# Patient Record
Sex: FEMALE | Race: OTHER | HISPANIC OR LATINO
[De-identification: names, ages, dates, MRNs, and addresses within clinical notes are randomized per-mention and may not be internally consistent; named-entity substitution may affect disease eponyms.]

---

## 2017-01-17 ENCOUNTER — APPOINTMENT (OUTPATIENT)
Dept: DERMATOLOGY | Facility: CLINIC | Age: 37
End: 2017-01-17

## 2017-01-17 VITALS — HEIGHT: 62 IN | WEIGHT: 133 LBS | BODY MASS INDEX: 24.48 KG/M2

## 2017-01-17 VITALS — SYSTOLIC BLOOD PRESSURE: 105 MMHG | DIASTOLIC BLOOD PRESSURE: 63 MMHG

## 2017-01-23 ENCOUNTER — APPOINTMENT (OUTPATIENT)
Dept: OBGYN | Facility: CLINIC | Age: 37
End: 2017-01-23

## 2017-01-23 VITALS
DIASTOLIC BLOOD PRESSURE: 60 MMHG | WEIGHT: 133 LBS | BODY MASS INDEX: 24.48 KG/M2 | HEIGHT: 62 IN | SYSTOLIC BLOOD PRESSURE: 98 MMHG

## 2017-03-03 ENCOUNTER — APPOINTMENT (OUTPATIENT)
Dept: GASTROENTEROLOGY | Facility: CLINIC | Age: 37
End: 2017-03-03

## 2017-03-03 DIAGNOSIS — Z80.0 FAMILY HISTORY OF MALIGNANT NEOPLASM OF DIGESTIVE ORGANS: ICD-10-CM

## 2017-04-03 ENCOUNTER — APPOINTMENT (OUTPATIENT)
Dept: INTERNAL MEDICINE | Facility: CLINIC | Age: 37
End: 2017-04-03

## 2017-04-13 ENCOUNTER — APPOINTMENT (OUTPATIENT)
Dept: INTERNAL MEDICINE | Facility: CLINIC | Age: 37
End: 2017-04-13

## 2017-04-13 VITALS
BODY MASS INDEX: 25.4 KG/M2 | WEIGHT: 138 LBS | SYSTOLIC BLOOD PRESSURE: 101 MMHG | DIASTOLIC BLOOD PRESSURE: 67 MMHG | HEART RATE: 71 BPM | HEIGHT: 62 IN

## 2017-04-13 DIAGNOSIS — R10.13 EPIGASTRIC PAIN: ICD-10-CM

## 2017-04-13 DIAGNOSIS — N91.5 OLIGOMENORRHEA, UNSPECIFIED: ICD-10-CM

## 2017-04-13 DIAGNOSIS — Z86.19 PERSONAL HISTORY OF OTHER INFECTIOUS AND PARASITIC DISEASES: ICD-10-CM

## 2017-04-13 DIAGNOSIS — G89.29 EPIGASTRIC PAIN: ICD-10-CM

## 2017-04-13 RX ORDER — CLINDAMYCIN PHOSPHATE 1 G/10ML
1 GEL TOPICAL TWICE DAILY
Qty: 1 | Refills: 5 | Status: COMPLETED | COMMUNITY
Start: 2017-01-17 | End: 2017-04-13

## 2017-04-21 ENCOUNTER — OUTPATIENT (OUTPATIENT)
Dept: OUTPATIENT SERVICES | Facility: HOSPITAL | Age: 37
LOS: 1 days | Discharge: HOME | End: 2017-04-21

## 2017-04-25 ENCOUNTER — APPOINTMENT (OUTPATIENT)
Dept: DERMATOLOGY | Facility: CLINIC | Age: 37
End: 2017-04-25

## 2017-06-16 ENCOUNTER — APPOINTMENT (OUTPATIENT)
Dept: GASTROENTEROLOGY | Facility: CLINIC | Age: 37
End: 2017-06-16

## 2017-06-27 DIAGNOSIS — K31.9 DISEASE OF STOMACH AND DUODENUM, UNSPECIFIED: ICD-10-CM

## 2017-06-27 DIAGNOSIS — R10.9 UNSPECIFIED ABDOMINAL PAIN: ICD-10-CM

## 2017-06-27 DIAGNOSIS — K44.9 DIAPHRAGMATIC HERNIA WITHOUT OBSTRUCTION OR GANGRENE: ICD-10-CM

## 2017-06-27 DIAGNOSIS — K29.50 UNSPECIFIED CHRONIC GASTRITIS WITHOUT BLEEDING: ICD-10-CM

## 2017-06-27 DIAGNOSIS — B96.81 HELICOBACTER PYLORI [H. PYLORI] AS THE CAUSE OF DISEASES CLASSIFIED ELSEWHERE: ICD-10-CM

## 2017-08-15 LAB
ALBUMIN SERPL-MCNC: 3.9 G/DL
ALBUMIN/GLOB SERPL: 1.39
ALP SERPL-CCNC: 60 IU/L
ALT SERPL-CCNC: 21 IU/L
ANION GAP SERPL CALC-SCNC: 8 MEQ/L
AST SERPL-CCNC: 21 IU/L
BASOPHILS # BLD: 0.01 TH/MM3
BASOPHILS NFR BLD: 0.2 %
BILIRUB SERPL-MCNC: 0.5 MG/DL
BUN SERPL-MCNC: 11 MG/DL
BUN/CREAT SERPL: 16.9 %
CALCIUM SERPL-MCNC: 9.4 MG/DL
CHLORIDE SERPL-SCNC: 108 MEQ/L
CO2 SERPL-SCNC: 25 MEQ/L
CREAT SERPL-MCNC: 0.65 MG/DL
EOSINOPHIL # BLD: 0.05 TH/MM3
EOSINOPHIL NFR BLD: 0.9 %
ERYTHROCYTE [DISTWIDTH] IN BLOOD BY AUTOMATED COUNT: 12.7 %
GFR SERPL CREATININE-BSD FRML MDRD: 101
GLUCOSE SERPL-MCNC: 93 MG/DL
GRANULOCYTES # BLD: 3.24 TH/MM3
GRANULOCYTES NFR BLD: 55.4 %
HCT VFR BLD AUTO: 42.2 %
HGB BLD-MCNC: 13.5 G/DL
IMM GRANULOCYTES # BLD: 0.01 TH/MM3
IMM GRANULOCYTES NFR BLD: 0.2 %
INR PPP: 1.1
LYMPHOCYTES # BLD: 2.16 TH/MM3
LYMPHOCYTES NFR BLD: 37 %
MCH RBC QN AUTO: 29.2 PG
MCHC RBC AUTO-ENTMCNC: 32 G/DL
MCV RBC AUTO: 91.3 FL
MONOCYTES # BLD: 0.37 TH/MM3
MONOCYTES NFR BLD: 6.3 %
PLATELET # BLD: 197 TH/MM3
PMV BLD AUTO: 12.5 FL
POTASSIUM SERPL-SCNC: 4.5 MMOL/L
PROT SERPL-MCNC: 6.7 G/DL
PROTHROMBIN TIME: 11.6 SEC
RBC # BLD AUTO: 4.62 MIL/MM3
SODIUM SERPL-SCNC: 141 MEQ/L
WBC # BLD: 5.84 TH/MM3

## 2017-10-12 ENCOUNTER — OUTPATIENT (OUTPATIENT)
Dept: OUTPATIENT SERVICES | Facility: HOSPITAL | Age: 37
LOS: 1 days | Discharge: HOME | End: 2017-10-12

## 2017-10-12 ENCOUNTER — RESULT REVIEW (OUTPATIENT)
Age: 37
End: 2017-10-12

## 2017-10-12 ENCOUNTER — APPOINTMENT (OUTPATIENT)
Dept: INTERNAL MEDICINE | Facility: CLINIC | Age: 37
End: 2017-10-12

## 2017-10-12 VITALS
HEIGHT: 62 IN | DIASTOLIC BLOOD PRESSURE: 70 MMHG | WEIGHT: 134 LBS | HEART RATE: 66 BPM | SYSTOLIC BLOOD PRESSURE: 110 MMHG | BODY MASS INDEX: 24.66 KG/M2

## 2017-10-12 DIAGNOSIS — Z87.19 PERSONAL HISTORY OF OTHER DISEASES OF THE DIGESTIVE SYSTEM: ICD-10-CM

## 2017-10-13 DIAGNOSIS — N64.4 MASTODYNIA: ICD-10-CM

## 2017-10-13 DIAGNOSIS — K29.70 GASTRITIS, UNSPECIFIED, WITHOUT BLEEDING: ICD-10-CM

## 2017-10-13 DIAGNOSIS — K59.00 CONSTIPATION, UNSPECIFIED: ICD-10-CM

## 2017-10-26 ENCOUNTER — EMERGENCY (EMERGENCY)
Facility: HOSPITAL | Age: 37
LOS: 0 days | Discharge: HOME | End: 2017-10-26
Admitting: INTERNAL MEDICINE

## 2017-10-26 DIAGNOSIS — R10.30 LOWER ABDOMINAL PAIN, UNSPECIFIED: ICD-10-CM

## 2017-10-26 DIAGNOSIS — Z79.899 OTHER LONG TERM (CURRENT) DRUG THERAPY: ICD-10-CM

## 2017-10-26 DIAGNOSIS — O26.891 OTHER SPECIFIED PREGNANCY RELATED CONDITIONS, FIRST TRIMESTER: ICD-10-CM

## 2017-10-26 DIAGNOSIS — Z3A.01 LESS THAN 8 WEEKS GESTATION OF PREGNANCY: ICD-10-CM

## 2017-10-28 ENCOUNTER — OUTPATIENT (OUTPATIENT)
Dept: OUTPATIENT SERVICES | Facility: HOSPITAL | Age: 37
LOS: 1 days | Discharge: HOME | End: 2017-10-28

## 2017-10-28 DIAGNOSIS — O20.0 THREATENED ABORTION: ICD-10-CM

## 2017-10-30 ENCOUNTER — OUTPATIENT (OUTPATIENT)
Dept: OUTPATIENT SERVICES | Facility: HOSPITAL | Age: 37
LOS: 1 days | Discharge: HOME | End: 2017-10-30

## 2017-10-30 DIAGNOSIS — O20.0 THREATENED ABORTION: ICD-10-CM

## 2017-10-31 ENCOUNTER — OUTPATIENT (OUTPATIENT)
Dept: OUTPATIENT SERVICES | Facility: HOSPITAL | Age: 37
LOS: 1 days | Discharge: HOME | End: 2017-10-31

## 2017-10-31 ENCOUNTER — APPOINTMENT (OUTPATIENT)
Dept: ANTEPARTUM | Facility: CLINIC | Age: 37
End: 2017-10-31

## 2017-11-03 ENCOUNTER — APPOINTMENT (OUTPATIENT)
Dept: OBGYN | Facility: CLINIC | Age: 37
End: 2017-11-03

## 2017-11-03 ENCOUNTER — OUTPATIENT (OUTPATIENT)
Dept: OUTPATIENT SERVICES | Facility: HOSPITAL | Age: 37
LOS: 1 days | Discharge: HOME | End: 2017-11-03

## 2017-11-03 VITALS
DIASTOLIC BLOOD PRESSURE: 60 MMHG | BODY MASS INDEX: 24.56 KG/M2 | WEIGHT: 133.5 LBS | HEIGHT: 62 IN | SYSTOLIC BLOOD PRESSURE: 100 MMHG

## 2017-11-03 DIAGNOSIS — B96.81 PEPTIC ULCER, SITE UNSPECIFIED, UNSPECIFIED AS ACUTE OR CHRONIC, W/OUT HEMORRHAGE OR PERFORATION: ICD-10-CM

## 2017-11-03 DIAGNOSIS — K27.9 PEPTIC ULCER, SITE UNSPECIFIED, UNSPECIFIED AS ACUTE OR CHRONIC, W/OUT HEMORRHAGE OR PERFORATION: ICD-10-CM

## 2017-11-10 DIAGNOSIS — O20.0 THREATENED ABORTION: ICD-10-CM

## 2017-11-10 DIAGNOSIS — O36.80X1 PREGNANCY WITH INCONCLUSIVE FETAL VIABILITY, FETUS 1: ICD-10-CM

## 2017-11-11 LAB
A VAGINAE DNA VAG NAA+PROBE-LOG#: NOT DETECTED
BV SCORE VAG QL NAA+PROBE: NORMAL
C GLABRATA DNA VAG QL NAA+PROBE: NOT DETECTED
C PARAP DNA VAG QL NAA+PROBE: NOT DETECTED
C TRACH RRNA SPEC QL NAA+PROBE: NOT DETECTED
C TROPICLS DNA VAG QL NAA+PROBE: NOT DETECTED
CANDIDA DNA VAG QL NAA+PROBE: NOT DETECTED
G VAGINALIS DNA VAG NAA+PROBE-LOG#: <4.7
LACTOBACILLUS DNA VAG NAA+PROBE-LOG#: 7.9
MEGASPHAERA SP DNA VAG NAA+PROBE-LOG#: NOT DETECTED
N GONORRHOEA RRNA SPEC QL NAA+PROBE: NOT DETECTED
T VAGINALIS RRNA SPEC QL NAA+PROBE: NOT DETECTED

## 2017-11-14 ENCOUNTER — APPOINTMENT (OUTPATIENT)
Dept: ANTEPARTUM | Facility: CLINIC | Age: 37
End: 2017-11-14

## 2017-11-14 ENCOUNTER — OUTPATIENT (OUTPATIENT)
Dept: OUTPATIENT SERVICES | Facility: HOSPITAL | Age: 37
LOS: 1 days | Discharge: HOME | End: 2017-11-14

## 2017-11-17 ENCOUNTER — NON-APPOINTMENT (OUTPATIENT)
Age: 37
End: 2017-11-17

## 2017-11-17 ENCOUNTER — OUTPATIENT (OUTPATIENT)
Dept: OUTPATIENT SERVICES | Facility: HOSPITAL | Age: 37
LOS: 1 days | Discharge: HOME | End: 2017-11-17

## 2017-11-17 ENCOUNTER — APPOINTMENT (OUTPATIENT)
Dept: OBGYN | Facility: CLINIC | Age: 37
End: 2017-11-17

## 2017-11-17 VITALS — WEIGHT: 134 LBS | SYSTOLIC BLOOD PRESSURE: 90 MMHG | BODY MASS INDEX: 24.51 KG/M2 | DIASTOLIC BLOOD PRESSURE: 60 MMHG

## 2017-11-17 DIAGNOSIS — O20.0 THREATENED ABORTION: ICD-10-CM

## 2017-11-22 ENCOUNTER — APPOINTMENT (OUTPATIENT)
Dept: OBGYN | Facility: CLINIC | Age: 37
End: 2017-11-22

## 2017-12-01 ENCOUNTER — APPOINTMENT (OUTPATIENT)
Dept: OBGYN | Facility: CLINIC | Age: 37
End: 2017-12-01

## 2017-12-07 ENCOUNTER — RESULT REVIEW (OUTPATIENT)
Age: 37
End: 2017-12-07

## 2017-12-07 ENCOUNTER — APPOINTMENT (OUTPATIENT)
Dept: OBGYN | Facility: CLINIC | Age: 37
End: 2017-12-07

## 2017-12-07 VITALS — WEIGHT: 133.44 LBS | BODY MASS INDEX: 24.41 KG/M2

## 2017-12-08 LAB
BASOPHILS # BLD: 0.01 TH/MM3
BASOPHILS NFR BLD: 0.1 %
DIFFERENTIAL METHOD BLD: NORMAL
EOSINOPHIL # BLD: 0.06 TH/MM3
EOSINOPHIL NFR BLD: 0.7 %
ERYTHROCYTE [DISTWIDTH] IN BLOOD BY AUTOMATED COUNT: 12.9 %
GRANULOCYTES # BLD: 5.03 TH/MM3
GRANULOCYTES NFR BLD: 60.9 %
HBV SURFACE AG SER-ACNC: NONREACTIVE
HCT VFR BLD AUTO: 39.2 %
HGB BLD-MCNC: 12.7 G/DL
IMM GRANULOCYTES # BLD: 0.04 TH/MM3
IMM GRANULOCYTES NFR BLD: 0.5 %
LYMPHOCYTES # BLD: 2.31 TH/MM3
LYMPHOCYTES NFR BLD: 27.9 %
MCH RBC QN AUTO: 29.1 PG
MCHC RBC AUTO-ENTMCNC: 32.4 G/DL
MCV RBC AUTO: 89.9 FL
MONOCYTES # BLD: 0.82 TH/MM3
MONOCYTES NFR BLD: 9.9 %
PLATELET # BLD: 223 TH/MM3
PMV BLD AUTO: 11.8 FL
RBC # BLD AUTO: 4.36 MIL/MM3
RPR SER QL: NONREACTIVE
RUBV IGG SER-ACNC: 18.4 INDEX
RUBV IGG SER-IMP: POSITIVE
VZV IGG FLD QL IA: > 4000 INDEX
VZV IGG FLD QL IA: POSITIVE
WBC # BLD: 8.27 TH/MM3

## 2017-12-11 LAB
BACTERIA UR CULT: NORMAL
GAMMA INTERFERON BACKGROUND BLD IA-ACNC: 0.04 IU/ML
HIV1+2 AB SPEC QL IA.RAPID: NONREACTIVE
LEAD BLD-MCNC: <1 MCG/DL
M TB IFN-G BLD-IMP: NEGATIVE
M TB IFN-G CD4+ T-CELLS BLD-ACNC: 0 IU/ML
MITOGEN IGNF BLD-ACNC: 7.73 IU/ML
SPECIMEN SOURCE: NORMAL

## 2017-12-15 ENCOUNTER — APPOINTMENT (OUTPATIENT)
Dept: OBGYN | Facility: CLINIC | Age: 37
End: 2017-12-15

## 2017-12-15 LAB
HGB A MFR BLD: 97.4 %
HGB A2 MFR BLD: 2.6 %
HGB FRACT BLD ELPH CITRATE-IMP: NORMAL

## 2017-12-19 ENCOUNTER — OUTPATIENT (OUTPATIENT)
Dept: OUTPATIENT SERVICES | Facility: HOSPITAL | Age: 37
LOS: 1 days | Discharge: HOME | End: 2017-12-19

## 2017-12-19 ENCOUNTER — APPOINTMENT (OUTPATIENT)
Dept: OBGYN | Facility: CLINIC | Age: 37
End: 2017-12-19

## 2017-12-19 DIAGNOSIS — Z01.419 ENCOUNTER FOR GYNECOLOGICAL EXAMINATION (GENERAL) (ROUTINE) W/OUT ABNORMAL FINDINGS: ICD-10-CM

## 2017-12-19 DIAGNOSIS — N63.20 UNSPECIFIED LUMP IN THE RIGHT BREAST, UNSPECIFIED QUADRANT: ICD-10-CM

## 2017-12-19 DIAGNOSIS — N63.10 UNSPECIFIED LUMP IN THE RIGHT BREAST, UNSPECIFIED QUADRANT: ICD-10-CM

## 2017-12-19 DIAGNOSIS — D24.2 BENIGN NEOPLASM OF LEFT BREAST: ICD-10-CM

## 2017-12-22 LAB
AR GENE MUT ANL BLD/T: NORMAL
CFTR MUT ANL BLD/T: NEGATIVE
SMN1(NORTH): NORMAL
SMN2(NORTH): NORMAL
TECHNICAL RESULTS(NORTH): NORMAL

## 2017-12-23 ENCOUNTER — EMERGENCY (EMERGENCY)
Facility: HOSPITAL | Age: 37
LOS: 0 days | Discharge: HOME | End: 2017-12-24
Admitting: INTERNAL MEDICINE

## 2017-12-23 DIAGNOSIS — Z79.899 OTHER LONG TERM (CURRENT) DRUG THERAPY: ICD-10-CM

## 2017-12-23 DIAGNOSIS — O09.521 SUPERVISION OF ELDERLY MULTIGRAVIDA, FIRST TRIMESTER: ICD-10-CM

## 2017-12-23 DIAGNOSIS — Z3A.01 LESS THAN 8 WEEKS GESTATION OF PREGNANCY: ICD-10-CM

## 2017-12-23 DIAGNOSIS — O20.0 THREATENED ABORTION: ICD-10-CM

## 2017-12-23 DIAGNOSIS — N93.9 ABNORMAL UTERINE AND VAGINAL BLEEDING, UNSPECIFIED: ICD-10-CM

## 2017-12-26 ENCOUNTER — OUTPATIENT (OUTPATIENT)
Dept: OUTPATIENT SERVICES | Facility: HOSPITAL | Age: 37
LOS: 1 days | Discharge: HOME | End: 2017-12-26

## 2017-12-26 ENCOUNTER — APPOINTMENT (OUTPATIENT)
Dept: ANTEPARTUM | Facility: CLINIC | Age: 37
End: 2017-12-26

## 2017-12-26 ENCOUNTER — APPOINTMENT (OUTPATIENT)
Dept: OBGYN | Facility: CLINIC | Age: 37
End: 2017-12-26

## 2017-12-26 VITALS
WEIGHT: 135.9 LBS | DIASTOLIC BLOOD PRESSURE: 62 MMHG | SYSTOLIC BLOOD PRESSURE: 100 MMHG | HEIGHT: 62 IN | BODY MASS INDEX: 25.01 KG/M2

## 2017-12-26 DIAGNOSIS — O02.1 MISSED ABORTION: ICD-10-CM

## 2017-12-27 ENCOUNTER — EMERGENCY (EMERGENCY)
Facility: HOSPITAL | Age: 37
LOS: 0 days | Discharge: HOME | End: 2017-12-28
Admitting: INTERNAL MEDICINE

## 2017-12-27 ENCOUNTER — APPOINTMENT (OUTPATIENT)
Dept: OBGYN | Facility: CLINIC | Age: 37
End: 2017-12-27

## 2017-12-27 ENCOUNTER — OUTPATIENT (OUTPATIENT)
Dept: OUTPATIENT SERVICES | Facility: HOSPITAL | Age: 37
LOS: 1 days | Discharge: HOME | End: 2017-12-27

## 2017-12-27 DIAGNOSIS — Z79.899 OTHER LONG TERM (CURRENT) DRUG THERAPY: ICD-10-CM

## 2017-12-27 DIAGNOSIS — N93.9 ABNORMAL UTERINE AND VAGINAL BLEEDING, UNSPECIFIED: ICD-10-CM

## 2017-12-27 DIAGNOSIS — O03.9 COMPLETE OR UNSPECIFIED SPONTANEOUS ABORTION WITHOUT COMPLICATION: ICD-10-CM

## 2017-12-29 ENCOUNTER — APPOINTMENT (OUTPATIENT)
Dept: GASTROENTEROLOGY | Facility: CLINIC | Age: 37
End: 2017-12-29

## 2017-12-29 ENCOUNTER — APPOINTMENT (OUTPATIENT)
Dept: OBGYN | Facility: CLINIC | Age: 37
End: 2017-12-29

## 2018-01-02 ENCOUNTER — OUTPATIENT (OUTPATIENT)
Dept: OUTPATIENT SERVICES | Facility: HOSPITAL | Age: 38
LOS: 1 days | Discharge: HOME | End: 2018-01-02

## 2018-01-02 ENCOUNTER — APPOINTMENT (OUTPATIENT)
Dept: OBGYN | Facility: CLINIC | Age: 38
End: 2018-01-02

## 2018-01-02 VITALS
HEIGHT: 62 IN | SYSTOLIC BLOOD PRESSURE: 100 MMHG | BODY MASS INDEX: 24.84 KG/M2 | DIASTOLIC BLOOD PRESSURE: 60 MMHG | WEIGHT: 135 LBS

## 2018-01-03 ENCOUNTER — APPOINTMENT (OUTPATIENT)
Dept: OBGYN | Facility: CLINIC | Age: 38
End: 2018-01-03

## 2018-01-05 ENCOUNTER — OUTPATIENT (OUTPATIENT)
Dept: OUTPATIENT SERVICES | Facility: HOSPITAL | Age: 38
LOS: 1 days | Discharge: HOME | End: 2018-01-05

## 2018-01-05 ENCOUNTER — APPOINTMENT (OUTPATIENT)
Dept: ANTEPARTUM | Facility: CLINIC | Age: 38
End: 2018-01-05

## 2018-01-09 DIAGNOSIS — N96 RECURRENT PREGNANCY LOSS: ICD-10-CM

## 2018-01-11 ENCOUNTER — OUTPATIENT (OUTPATIENT)
Dept: OUTPATIENT SERVICES | Facility: HOSPITAL | Age: 38
LOS: 1 days | Discharge: HOME | End: 2018-01-11

## 2018-01-11 DIAGNOSIS — K59.00 CONSTIPATION, UNSPECIFIED: ICD-10-CM

## 2018-01-11 DIAGNOSIS — N64.4 MASTODYNIA: ICD-10-CM

## 2018-01-11 DIAGNOSIS — K29.70 GASTRITIS, UNSPECIFIED, WITHOUT BLEEDING: ICD-10-CM

## 2018-01-12 LAB
ALBUMIN SERPL-MCNC: 4.1 G/DL
ALBUMIN/GLOB SERPL: 2.05
ALP SERPL-CCNC: 42 IU/L
ALT SERPL-CCNC: 20 IU/L
ANION GAP SERPL CALC-SCNC: 7 MEQ/L
AST SERPL-CCNC: 20 IU/L
BASOPHILS # BLD: 0.01 TH/MM3
BASOPHILS NFR BLD: 0.2 %
BILIRUB SERPL-MCNC: 0.6 MG/DL
BUN SERPL-MCNC: 8 MG/DL
BUN/CREAT SERPL: 11.9 %
CALCIUM SERPL-MCNC: 9.5 MG/DL
CHLORIDE SERPL-SCNC: 106 MEQ/L
CHOLEST SERPL-MCNC: 193 MG/DL
CO2 SERPL-SCNC: 26 MEQ/L
CREAT SERPL-MCNC: 0.67 MG/DL
DIFFERENTIAL METHOD BLD: NORMAL
EOSINOPHIL # BLD: 0.1 TH/MM3
EOSINOPHIL NFR BLD: 1.9 %
ERYTHROCYTE [DISTWIDTH] IN BLOOD BY AUTOMATED COUNT: 13.4 %
ESTIMATED AVERGAGE GLUCOSE (NORTH): 111 MG/DL
GFR SERPL CREATININE-BSD FRML MDRD: 98
GLUCOSE SERPL-MCNC: 94 MG/DL
GRANULOCYTES # BLD: 2.78 TH/MM3
GRANULOCYTES NFR BLD: 52.3 %
HBA1C MFR BLD: 5.5 %
HCT VFR BLD AUTO: 38.4 %
HDLC SERPL-MCNC: 46 MG/DL
HDLC SERPL: 4.2
HGB BLD-MCNC: 12.3 G/DL
IMM GRANULOCYTES # BLD: 0.03 TH/MM3
IMM GRANULOCYTES NFR BLD: 0.6 %
LDLC SERPL DIRECT ASSAY-MCNC: 119 MG/DL
LYMPHOCYTES # BLD: 1.96 TH/MM3
LYMPHOCYTES NFR BLD: 36.9 %
MCH RBC QN AUTO: 28.9 PG
MCHC RBC AUTO-ENTMCNC: 32 G/DL
MCV RBC AUTO: 90.4 FL
MONOCYTES # BLD: 0.43 TH/MM3
MONOCYTES NFR BLD: 8.1 %
PLATELET # BLD: 218 TH/MM3
PMV BLD AUTO: 11.8 FL
POTASSIUM SERPL-SCNC: 4.1 MMOL/L
PROLACTIN SERPL-MCNC: 26.4 NG/ML
PROT SERPL-MCNC: 6.1 G/DL
RBC # BLD AUTO: 4.25 MIL/MM3
SODIUM SERPL-SCNC: 139 MEQ/L
TRIGL SERPL-MCNC: 122 MG/DL
TSH SERPL DL<=0.005 MIU/L-ACNC: 1.63 UIU/ML
VLDLC SERPL-MCNC: 24 MG/DL
WBC # BLD: 5.31 TH/MM3

## 2018-01-16 ENCOUNTER — OUTPATIENT (OUTPATIENT)
Dept: OUTPATIENT SERVICES | Facility: HOSPITAL | Age: 38
LOS: 1 days | Discharge: HOME | End: 2018-01-16

## 2018-01-16 ENCOUNTER — OTHER (OUTPATIENT)
Age: 38
End: 2018-01-16

## 2018-01-16 DIAGNOSIS — N63.10 UNSPECIFIED LUMP IN THE RIGHT BREAST, UNSPECIFIED QUADRANT: ICD-10-CM

## 2018-01-16 DIAGNOSIS — N63.20 UNSPECIFIED LUMP IN THE LEFT BREAST, UNSPECIFIED QUADRANT: ICD-10-CM

## 2018-01-17 LAB
25(OH)D3 SERPL-MCNC: 27 NG/ML
VITAMIN D2 SERPL-MCNC: <4 NG/ML
VITAMIN D3 SERPL-MCNC: 27 NG/ML

## 2018-01-18 ENCOUNTER — APPOINTMENT (OUTPATIENT)
Dept: INTERNAL MEDICINE | Facility: CLINIC | Age: 38
End: 2018-01-18

## 2018-01-18 ENCOUNTER — OUTPATIENT (OUTPATIENT)
Dept: OUTPATIENT SERVICES | Facility: HOSPITAL | Age: 38
LOS: 1 days | Discharge: HOME | End: 2018-01-18

## 2018-01-18 VITALS
WEIGHT: 135 LBS | DIASTOLIC BLOOD PRESSURE: 73 MMHG | SYSTOLIC BLOOD PRESSURE: 104 MMHG | HEIGHT: 62 IN | BODY MASS INDEX: 24.84 KG/M2 | HEART RATE: 75 BPM

## 2018-01-18 DIAGNOSIS — E55.9 VITAMIN D DEFICIENCY, UNSPECIFIED: ICD-10-CM

## 2018-01-18 RX ORDER — AMOXICILLIN 500 MG/1
500 TABLET, FILM COATED ORAL
Qty: 56 | Refills: 0 | Status: COMPLETED | COMMUNITY
Start: 2017-10-12 | End: 2018-01-15

## 2018-01-18 RX ORDER — CLARITHROMYCIN 500 MG/1
500 TABLET, FILM COATED ORAL
Qty: 28 | Refills: 0 | Status: COMPLETED | COMMUNITY
Start: 2017-10-12 | End: 2018-01-15

## 2018-01-18 RX ORDER — OMEPRAZOLE 40 MG/1
40 CAPSULE, DELAYED RELEASE ORAL
Qty: 30 | Refills: 2 | Status: DISCONTINUED | COMMUNITY
Start: 2017-10-12 | End: 2018-01-18

## 2018-01-19 ENCOUNTER — OUTPATIENT (OUTPATIENT)
Dept: OUTPATIENT SERVICES | Facility: HOSPITAL | Age: 38
LOS: 1 days | Discharge: HOME | End: 2018-01-19

## 2018-01-19 DIAGNOSIS — D24.2 BENIGN NEOPLASM OF LEFT BREAST: ICD-10-CM

## 2018-01-19 DIAGNOSIS — N60.22 FIBROADENOSIS OF LEFT BREAST: ICD-10-CM

## 2018-01-19 DIAGNOSIS — N62 HYPERTROPHY OF BREAST: ICD-10-CM

## 2018-01-19 DIAGNOSIS — N60.32 FIBROSCLEROSIS OF LEFT BREAST: ICD-10-CM

## 2018-01-19 DIAGNOSIS — N63.20 UNSPECIFIED LUMP IN THE LEFT BREAST, UNSPECIFIED QUADRANT: ICD-10-CM

## 2018-01-30 ENCOUNTER — RESULT REVIEW (OUTPATIENT)
Age: 38
End: 2018-01-30

## 2018-01-30 ENCOUNTER — OUTPATIENT (OUTPATIENT)
Dept: OUTPATIENT SERVICES | Facility: HOSPITAL | Age: 38
LOS: 1 days | Discharge: HOME | End: 2018-01-30

## 2018-01-30 ENCOUNTER — APPOINTMENT (OUTPATIENT)
Dept: OBGYN | Facility: CLINIC | Age: 38
End: 2018-01-30

## 2018-01-30 VITALS — BODY MASS INDEX: 25.42 KG/M2 | SYSTOLIC BLOOD PRESSURE: 100 MMHG | DIASTOLIC BLOOD PRESSURE: 64 MMHG | WEIGHT: 139 LBS

## 2018-02-06 LAB
A VAGINAE DNA VAG NAA+PROBE-LOG#: NOT DETECTED
BV SCORE VAG QL NAA+PROBE: NORMAL
C GLABRATA DNA VAG QL NAA+PROBE: NOT DETECTED
C PARAP DNA VAG QL NAA+PROBE: DETECTED
C TRACH RRNA SPEC QL NAA+PROBE: NOT DETECTED
C TROPICLS DNA VAG QL NAA+PROBE: NOT DETECTED
CANDIDA DNA VAG QL NAA+PROBE: NOT DETECTED
G VAGINALIS DNA VAG NAA+PROBE-LOG#: 4.7
LACTOBACILLUS DNA VAG NAA+PROBE-LOG#: NOT DETECTED
MEGASPHAERA SP DNA VAG NAA+PROBE-LOG#: NOT DETECTED
N GONORRHOEA RRNA SPEC QL NAA+PROBE: NOT DETECTED
T VAGINALIS RRNA SPEC QL NAA+PROBE: NOT DETECTED

## 2018-02-08 DIAGNOSIS — N76.0 ACUTE VAGINITIS: ICD-10-CM

## 2018-03-07 ENCOUNTER — LABORATORY RESULT (OUTPATIENT)
Age: 38
End: 2018-03-07

## 2018-03-09 ENCOUNTER — OUTPATIENT (OUTPATIENT)
Dept: OUTPATIENT SERVICES | Facility: HOSPITAL | Age: 38
LOS: 1 days | Discharge: HOME | End: 2018-03-09

## 2018-03-09 ENCOUNTER — APPOINTMENT (OUTPATIENT)
Dept: GASTROENTEROLOGY | Facility: CLINIC | Age: 38
End: 2018-03-09

## 2018-03-09 VITALS
SYSTOLIC BLOOD PRESSURE: 114 MMHG | HEART RATE: 60 BPM | BODY MASS INDEX: 25.42 KG/M2 | DIASTOLIC BLOOD PRESSURE: 74 MMHG | WEIGHT: 139 LBS

## 2018-03-30 ENCOUNTER — APPOINTMENT (OUTPATIENT)
Dept: GASTROENTEROLOGY | Facility: CLINIC | Age: 38
End: 2018-03-30

## 2018-04-13 ENCOUNTER — APPOINTMENT (OUTPATIENT)
Dept: GASTROENTEROLOGY | Facility: CLINIC | Age: 38
End: 2018-04-13

## 2018-04-13 ENCOUNTER — OUTPATIENT (OUTPATIENT)
Dept: OUTPATIENT SERVICES | Facility: HOSPITAL | Age: 38
LOS: 1 days | Discharge: HOME | End: 2018-04-13

## 2018-04-13 VITALS
HEIGHT: 62 IN | WEIGHT: 141 LBS | HEART RATE: 80 BPM | DIASTOLIC BLOOD PRESSURE: 67 MMHG | SYSTOLIC BLOOD PRESSURE: 103 MMHG | BODY MASS INDEX: 25.95 KG/M2

## 2018-04-13 RX ORDER — METRONIDAZOLE 250 MG/1
250 TABLET ORAL EVERY 6 HOURS
Qty: 56 | Refills: 0 | Status: DISCONTINUED | COMMUNITY
Start: 2018-04-13 | End: 2018-04-13

## 2018-04-13 RX ORDER — TETRACYCLINE HYDROCHLORIDE 500 MG/1
500 CAPSULE ORAL
Qty: 56 | Refills: 0 | Status: DISCONTINUED | COMMUNITY
Start: 2018-04-13 | End: 2018-04-13

## 2018-04-13 RX ORDER — BISMUTH SUBSALICYLATE 262 MG/1
262 TABLET, CHEWABLE ORAL 4 TIMES DAILY
Qty: 112 | Refills: 0 | Status: DISCONTINUED | COMMUNITY
Start: 2018-04-13 | End: 2018-04-13

## 2018-04-13 RX ORDER — PANTOPRAZOLE 40 MG/1
40 TABLET, DELAYED RELEASE ORAL DAILY
Qty: 30 | Refills: 1 | Status: DISCONTINUED | COMMUNITY
Start: 2018-03-09 | End: 2018-04-13

## 2018-04-26 ENCOUNTER — APPOINTMENT (OUTPATIENT)
Dept: INTERNAL MEDICINE | Facility: CLINIC | Age: 38
End: 2018-04-26

## 2018-05-01 ENCOUNTER — RESULT CHARGE (OUTPATIENT)
Age: 38
End: 2018-05-01

## 2018-05-01 ENCOUNTER — APPOINTMENT (OUTPATIENT)
Dept: OBGYN | Facility: CLINIC | Age: 38
End: 2018-05-01

## 2018-05-01 ENCOUNTER — NON-APPOINTMENT (OUTPATIENT)
Age: 38
End: 2018-05-01

## 2018-05-01 ENCOUNTER — OUTPATIENT (OUTPATIENT)
Dept: OUTPATIENT SERVICES | Facility: HOSPITAL | Age: 38
LOS: 1 days | Discharge: HOME | End: 2018-05-01

## 2018-05-01 VITALS — BODY MASS INDEX: 25.79 KG/M2 | WEIGHT: 141 LBS | SYSTOLIC BLOOD PRESSURE: 100 MMHG | DIASTOLIC BLOOD PRESSURE: 60 MMHG

## 2018-05-01 DIAGNOSIS — Z87.42 PERSONAL HISTORY OF OTHER DISEASES OF THE FEMALE GENITAL TRACT: ICD-10-CM

## 2018-05-01 DIAGNOSIS — Z87.59 PERSONAL HISTORY OF OTHER COMPLICATIONS OF PREGNANCY, CHILDBIRTH AND THE PUERPERIUM: ICD-10-CM

## 2018-05-01 DIAGNOSIS — Z87.19 PERSONAL HISTORY OF OTHER DISEASES OF THE DIGESTIVE SYSTEM: ICD-10-CM

## 2018-05-01 DIAGNOSIS — Z09 ENCOUNTER FOR FOLLOW-UP EXAMINATION AFTER COMPLETED TREATMENT FOR CONDITIONS OTHER THAN MALIGNANT NEOPLASM: ICD-10-CM

## 2018-05-01 DIAGNOSIS — L70.9 ACNE, UNSPECIFIED: ICD-10-CM

## 2018-05-01 DIAGNOSIS — B37.3 CANDIDIASIS OF VULVA AND VAGINA: ICD-10-CM

## 2018-05-01 DIAGNOSIS — Z98.890 OTHER SPECIFIED POSTPROCEDURAL STATES: ICD-10-CM

## 2018-05-01 DIAGNOSIS — O02.1 MISSED ABORTION: ICD-10-CM

## 2018-05-01 DIAGNOSIS — Z87.898 PERSONAL HISTORY OF OTHER SPECIFIED CONDITIONS: ICD-10-CM

## 2018-05-01 DIAGNOSIS — Z30.09 ENCOUNTER FOR OTHER GENERAL COUNSELING AND ADVICE ON CONTRACEPTION: ICD-10-CM

## 2018-05-01 DIAGNOSIS — B37.9 CANDIDIASIS, UNSPECIFIED: ICD-10-CM

## 2018-05-01 DIAGNOSIS — Z86.39 PERSONAL HISTORY OF OTHER ENDOCRINE, NUTRITIONAL AND METABOLIC DISEASE: ICD-10-CM

## 2018-05-01 LAB
BILIRUB UR QL STRIP: NORMAL
CLARITY UR: CLEAR
COLLECTION METHOD: NORMAL
GLUCOSE UR-MCNC: NORMAL
HCG UR QL: 0.2 EU/DL
HGB UR QL STRIP.AUTO: NORMAL
KETONES UR-MCNC: NORMAL
LEUKOCYTE ESTERASE UR QL STRIP: NORMAL
NITRITE UR QL STRIP: NORMAL
PH UR STRIP: 6
PROT UR STRIP-MCNC: NORMAL
SP GR UR STRIP: 1.01

## 2018-05-01 RX ORDER — DOCUSATE SODIUM 100 MG/1
100 CAPSULE ORAL
Qty: 60 | Refills: 3 | Status: DISCONTINUED | COMMUNITY
Start: 2017-10-12 | End: 2018-05-01

## 2018-05-01 RX ORDER — TERCONAZOLE 4 MG/G
0.4 CREAM VAGINAL DAILY
Qty: 1 | Refills: 0 | Status: DISCONTINUED | COMMUNITY
Start: 2018-01-30 | End: 2018-05-01

## 2018-05-01 RX ORDER — VITAMIN A ACETATE, .BETA.-CAROTENE, ASCORBIC ACID, CHOLECALCIFEROL, .ALPHA.-TOCOPHEROL ACETATE, DL-, THIAMINE MONONITRATE, RIBOFLAVIN, NIACINAMIDE, PYRIDOXINE HYDROCHLORIDE, FOLIC ACID, CYANOCOBALAMIN, CALCIUM CARBONATE, FERROUS FUMARATE, ZINC OXIDE, CUPRIC OXIDE 3080; 920; 120; 400; 22; 1.84; 3; 20; 10; 1; 12; 200; 27; 25; 2 [IU]/1; [IU]/1; MG/1; [IU]/1; MG/1; MG/1; MG/1; MG/1; MG/1; MG/1; UG/1; MG/1; MG/1; MG/1; MG/1
27-1 TABLET, FILM COATED ORAL DAILY
Qty: 90 | Refills: 0 | Status: DISCONTINUED | COMMUNITY
Start: 2018-01-02 | End: 2018-05-01

## 2018-05-01 RX ORDER — FLUCONAZOLE 150 MG/1
150 TABLET ORAL
Qty: 1 | Refills: 0 | Status: DISCONTINUED | COMMUNITY
Start: 2018-01-18 | End: 2018-05-01

## 2018-05-01 RX ORDER — MICONAZOLE NITRATE 4 %
4 CREAM WITH PREFILLED APPLICATOR VAGINAL
Qty: 1 | Refills: 0 | Status: DISCONTINUED | COMMUNITY
Start: 2017-11-03 | End: 2018-05-01

## 2018-05-01 RX ORDER — MISOPROSTOL 200 UG/1
200 TABLET ORAL
Qty: 8 | Refills: 0 | Status: DISCONTINUED | COMMUNITY
Start: 2017-12-27 | End: 2018-05-01

## 2018-05-01 RX ORDER — CHROMIUM 200 MCG
1000 TABLET ORAL
Qty: 30 | Refills: 3 | Status: DISCONTINUED | COMMUNITY
Start: 2018-01-18 | End: 2018-05-01

## 2018-05-01 RX ORDER — PANTOPRAZOLE 40 MG/1
40 TABLET, DELAYED RELEASE ORAL DAILY
Qty: 15 | Refills: 1 | Status: DISCONTINUED | COMMUNITY
Start: 2018-04-13 | End: 2018-05-01

## 2018-05-02 DIAGNOSIS — Z34.81 ENCOUNTER FOR SUPERVISION OF OTHER NORMAL PREGNANCY, FIRST TRIMESTER: ICD-10-CM

## 2018-05-02 LAB
C TRACH RRNA SPEC QL NAA+PROBE: NOT DETECTED
N GONORRHOEA RRNA SPEC QL NAA+PROBE: NOT DETECTED
SOURCE AMPLIFICATION: NORMAL

## 2018-05-04 LAB — HPV HIGH+LOW RISK DNA PNL CVX: NOT DETECTED

## 2018-05-10 ENCOUNTER — EMERGENCY (EMERGENCY)
Facility: HOSPITAL | Age: 38
LOS: 0 days | Discharge: HOME | End: 2018-05-11
Attending: EMERGENCY MEDICINE | Admitting: EMERGENCY MEDICINE

## 2018-05-10 VITALS
OXYGEN SATURATION: 99 % | HEART RATE: 69 BPM | SYSTOLIC BLOOD PRESSURE: 129 MMHG | TEMPERATURE: 99 F | DIASTOLIC BLOOD PRESSURE: 60 MMHG | RESPIRATION RATE: 20 BRPM

## 2018-05-10 DIAGNOSIS — N93.9 ABNORMAL UTERINE AND VAGINAL BLEEDING, UNSPECIFIED: ICD-10-CM

## 2018-05-10 DIAGNOSIS — O20.0 THREATENED ABORTION: ICD-10-CM

## 2018-05-10 DIAGNOSIS — Z79.2 LONG TERM (CURRENT) USE OF ANTIBIOTICS: ICD-10-CM

## 2018-05-10 DIAGNOSIS — R82.71 BACTERIURIA: ICD-10-CM

## 2018-05-10 DIAGNOSIS — Z3A.08 8 WEEKS GESTATION OF PREGNANCY: ICD-10-CM

## 2018-05-10 LAB
ANION GAP SERPL CALC-SCNC: 13 MMOL/L — SIGNIFICANT CHANGE UP (ref 7–14)
APPEARANCE UR: CLEAR — SIGNIFICANT CHANGE UP
BACTERIA # UR AUTO: (no result) /HPF
BASOPHILS # BLD AUTO: 0.01 K/UL — SIGNIFICANT CHANGE UP (ref 0–0.2)
BASOPHILS NFR BLD AUTO: 0.1 % — SIGNIFICANT CHANGE UP (ref 0–1)
BILIRUB UR-MCNC: NEGATIVE — SIGNIFICANT CHANGE UP
BLD GP AB SCN SERPL QL: SIGNIFICANT CHANGE UP
BUN SERPL-MCNC: 14 MG/DL — SIGNIFICANT CHANGE UP (ref 10–20)
CALCIUM SERPL-MCNC: 10 MG/DL — SIGNIFICANT CHANGE UP (ref 8.5–10.1)
CHLORIDE SERPL-SCNC: 101 MMOL/L — SIGNIFICANT CHANGE UP (ref 98–110)
CO2 SERPL-SCNC: 24 MMOL/L — SIGNIFICANT CHANGE UP (ref 17–32)
COLOR SPEC: YELLOW — SIGNIFICANT CHANGE UP
CREAT SERPL-MCNC: 0.7 MG/DL — SIGNIFICANT CHANGE UP (ref 0.7–1.5)
DIFF PNL FLD: (no result)
EOSINOPHIL # BLD AUTO: 0.04 K/UL — SIGNIFICANT CHANGE UP (ref 0–0.7)
EOSINOPHIL NFR BLD AUTO: 0.5 % — SIGNIFICANT CHANGE UP (ref 0–8)
EPI CELLS # UR: (no result) /HPF
GLUCOSE SERPL-MCNC: 98 MG/DL — SIGNIFICANT CHANGE UP (ref 70–99)
GLUCOSE UR QL: NEGATIVE MG/DL — SIGNIFICANT CHANGE UP
HCT VFR BLD CALC: 38.6 % — SIGNIFICANT CHANGE UP (ref 37–47)
HGB BLD-MCNC: 12.9 G/DL — SIGNIFICANT CHANGE UP (ref 12–16)
IMM GRANULOCYTES NFR BLD AUTO: 0.4 % — HIGH (ref 0.1–0.3)
KETONES UR-MCNC: NEGATIVE — SIGNIFICANT CHANGE UP
LEUKOCYTE ESTERASE UR-ACNC: NEGATIVE — SIGNIFICANT CHANGE UP
LYMPHOCYTES # BLD AUTO: 2.27 K/UL — SIGNIFICANT CHANGE UP (ref 1.2–3.4)
LYMPHOCYTES # BLD AUTO: 28.1 % — SIGNIFICANT CHANGE UP (ref 20.5–51.1)
MCHC RBC-ENTMCNC: 29.1 PG — SIGNIFICANT CHANGE UP (ref 27–31)
MCHC RBC-ENTMCNC: 33.4 G/DL — SIGNIFICANT CHANGE UP (ref 32–37)
MCV RBC AUTO: 86.9 FL — SIGNIFICANT CHANGE UP (ref 81–99)
MONOCYTES # BLD AUTO: 0.51 K/UL — SIGNIFICANT CHANGE UP (ref 0.1–0.6)
MONOCYTES NFR BLD AUTO: 6.3 % — SIGNIFICANT CHANGE UP (ref 1.7–9.3)
NEUTROPHILS # BLD AUTO: 5.23 K/UL — SIGNIFICANT CHANGE UP (ref 1.4–6.5)
NEUTROPHILS NFR BLD AUTO: 64.6 % — SIGNIFICANT CHANGE UP (ref 42.2–75.2)
NITRITE UR-MCNC: NEGATIVE — SIGNIFICANT CHANGE UP
NRBC # BLD: 0 /100 WBCS — SIGNIFICANT CHANGE UP (ref 0–0)
PH UR: 6.5 — SIGNIFICANT CHANGE UP (ref 5–8)
PLATELET # BLD AUTO: 211 K/UL — SIGNIFICANT CHANGE UP (ref 130–400)
POTASSIUM SERPL-MCNC: 4 MMOL/L — SIGNIFICANT CHANGE UP (ref 3.5–5)
POTASSIUM SERPL-SCNC: 4 MMOL/L — SIGNIFICANT CHANGE UP (ref 3.5–5)
PROT UR-MCNC: NEGATIVE MG/DL — SIGNIFICANT CHANGE UP
RBC # BLD: 4.44 M/UL — SIGNIFICANT CHANGE UP (ref 4.2–5.4)
RBC # FLD: 12.5 % — SIGNIFICANT CHANGE UP (ref 11.5–14.5)
RBC CASTS # UR COMP ASSIST: (no result) /HPF
SODIUM SERPL-SCNC: 138 MMOL/L — SIGNIFICANT CHANGE UP (ref 135–146)
SP GR SPEC: 1.01 — SIGNIFICANT CHANGE UP (ref 1.01–1.03)
TYPE + AB SCN PNL BLD: SIGNIFICANT CHANGE UP
UROBILINOGEN FLD QL: 0.2 MG/DL — SIGNIFICANT CHANGE UP (ref 0.2–0.2)
WBC # BLD: 8.09 K/UL — SIGNIFICANT CHANGE UP (ref 4.8–10.8)
WBC # FLD AUTO: 8.09 K/UL — SIGNIFICANT CHANGE UP (ref 4.8–10.8)
WBC UR QL: (no result) /HPF

## 2018-05-10 RX ORDER — SODIUM CHLORIDE 9 MG/ML
1000 INJECTION INTRAMUSCULAR; INTRAVENOUS; SUBCUTANEOUS ONCE
Qty: 0 | Refills: 0 | Status: COMPLETED | OUTPATIENT
Start: 2018-05-10 | End: 2018-05-10

## 2018-05-10 RX ADMIN — SODIUM CHLORIDE 1000 MILLILITER(S): 9 INJECTION INTRAMUSCULAR; INTRAVENOUS; SUBCUTANEOUS at 20:15

## 2018-05-10 NOTE — ED PROVIDER NOTE - OBJECTIVE STATEMENT
37 y/o F  here with vag bleeding that started this afternoon.  Brownish spotting.  LMP mar 7. Pt is 8-9 weeks pregnant.  Seen in women's clinic last week and everything was normal.  No ap. No n/v/d. No vaginal d/c. : #[id number]  37 y/o F  here with vag bleeding that started this afternoon.  Brownish spotting.  LMP mar 7. Pt is 8-9 weeks pregnant.  Seen in women's clinic last week and everything was normal.  No ap. No n/v/d. No vaginal d/c.

## 2018-05-10 NOTE — ED PROVIDER NOTE - PROGRESS NOTE DETAILS
d/w Pt using Colombian translators #[id number] and [id number] that she hs a threatened miscarriage and there is some "bleeding" near the gestational sac and she needs close f/u with her OB>  She should call for a sooner appt.  Recommended pelvic rest until f/u, but PT aware that miscarriage may happen anyway due to being in the first trimester.  Pt to continued prenatal vitamins.  And will treat bacteruria in pregnancy.

## 2018-05-10 NOTE — ED PROVIDER NOTE - NS ED ROS FT
Review of Systems:  · CONSTITUTIONAL: no fever and no chills.  · EYES: no discharge, no irritation, no pain, no redness, and no visual changes.  · ENMT: Ears: no ear pain and no hearing problems. Nose: no nasal congestion and no nasal drainage. Mouth/Throat: no dysphagia, no hoarseness and no throat pain.  · CARDIOVASCULAR: no chest pain  · RESPIRATORY: no cough, and no shortness of breath.  · GASTROINTESTINAL: no abdominal pain, no diarrhea, no nausea and no vomiting.  · GENITOURINARY: no dysuria, + vag bleeding  · MUSCULOSKELETAL: no back pain, no musculoskeletal pain, no weakness.  · SKIN: no rashes.  · NEURO: no loss of consciousness, no headache.  · ROS STATEMENT: all other ROS negative except as per HPI

## 2018-05-11 LAB
C TRACH RRNA SPEC QL NAA+PROBE: SIGNIFICANT CHANGE UP
N GONORRHOEA RRNA SPEC QL NAA+PROBE: SIGNIFICANT CHANGE UP
SPECIMEN SOURCE: SIGNIFICANT CHANGE UP

## 2018-05-11 RX ORDER — METHENAMINE MANDELATE 1 G
1 TABLET ORAL
Qty: 14 | Refills: 0 | OUTPATIENT
Start: 2018-05-11 | End: 2018-05-17

## 2018-05-11 NOTE — ED ADULT NURSE NOTE - PMH
Patient Education     Sinusitis (Antibiotic Treatment)    The sinuses are air-filled spaces within the bones of the face. They connect to the inside of the nose. Sinusitis is an inflammation of the tissue lining the sinus cavity. Sinus inflammation can occur during a cold. It can also be due to allergies to pollens and other particles in the air. Sinusitis can cause symptoms of sinus congestion and fullness. A sinus infection causes fever, headache and facial pain. There is often green or yellow drainage from the nose or into the back of the throat (post-nasal drip). You have been given antibiotics to treat this condition.  Home care:  · Take the full course of antibiotics as instructed. Do not stop taking them, even if you feel better.  · Drink plenty of water, hot tea, and other liquids. This may help thin mucus. It also may promote sinus drainage.  · Heat may help soothe painful areas of the face. Use a towel soaked in hot water. Or,  the shower and direct the hot spray onto your face. Using a vaporizer along with a menthol rub at night may also help.   · An expectorant containing guaifenesin may help thin the mucus and promote drainage from the sinuses.  · Over-the-counter decongestants may be used unless a similar medicine was prescribed. Nasal sprays work the fastest. Use one that contains phenylephrine or oxymetazoline. First blow the nose gently. Then use the spray. Do not use these medicines more often than directed on the label or symptoms may get worse. You may also use tablets containing pseudoephedrine. Avoid products that combine ingredients, because side effects may be increased. Read labels. You can also ask the pharmacist for help. (NOTE: Persons with high blood pressure should not use decongestants. They can raise blood pressure.)  · Over-the-counter antihistamines may help if allergies contributed to your sinusitis.    · Do not use nasal rinses or irrigation during an acute sinus  infection, unless told to by your health care provider. Rinsing may spread the infection to other sinuses.  · Use acetaminophen or ibuprofen to control pain, unless another pain medicine was prescribed. (If you have chronic liver or kidney disease or ever had a stomach ulcer, talk with your doctor before using these medicines. Aspirin should never be used in anyone under 18 years of age who is ill with a fever. It may cause severe liver damage.)  · Don't smoke. This can worsen symptoms.  Follow-up care  Follow up with your healthcare provider or our staff if you are not improving within the next week.  When to seek medical advice  Call your healthcare provider if any of these occur:  · Facial pain or headache becoming more severe  · Stiff neck  · Unusual drowsiness or confusion  · Swelling of the forehead or eyelids  · Vision problems, including blurred or double vision  · Fever of 100.4ºF (38ºC) or higher, or as directed by your healthcare provider  · Seizure  · Breathing problems  · Symptoms not resolving within 10 days  © 6072-5855 [employer]. [address] [place] [address]. All rights reserved. This information is not intended as a substitute for professional medical care. Always follow your healthcare professional's instructions.    Patient Education     Fluid in the Middle Ear, No Infection (Adult)  Earaches can happen without an infection. This occurs when air and fluid build up behind the eardrum causing a feeling of fullness and discomfort and reduced hearing. This is called otitis media with effusion (OME) or serous otitis media. It means there is fluid in the middle ear. It is not the same as acute otitis media, which is typically from infection.  OME can happen when you have a cold if congestion blocks the passage that drains the middle ear (eustachian tube). It may also occur with nasal allergies or after a bacterial middle ear infection.    The pain/discomfort may come and go.  You may hear clicking or popping sounds when you chew or swallow. You may feel that your balance is off. Or you may hear ringing in the ear.  It often takes from several weeks up to 3 months for the fluid to clear on its own. Oral pain relievers and ear drops help if there is pain. Decongestants and antihistamines sometimes help. Antibiotics don't help since there is no infection. Your doctor may prescribe a nasal spray to help reduce swelling in the nose and eustachian tube. This can allow the ear to drain.  If it doesn't improve after 3 months, surgery may be used to drain the fluid and insert a small tube in the eardrum to allow continued drainage.  Because the middle ear fluid can become infected, it is important to watch for signs of an ear infection which may develop later. These signs include increased ear pain, fever, or drainage from the ear.  Home care  The following guidelines will help you care for yourself at home:  · You may use acetaminophen or ibuprofen to control pain, unless another medicine was prescribed. [NOTE: If you have chronic liver or kidney disease or ever had a stomach ulcer or GI bleeding, talk with your doctor before using these medicines.] (Aspirin should never be used in anyone under 18 years of age who is ill with a fever. It may cause severe liver damage.)  · While not always helpful, you may use over-the-counter decongestants such as phenylephrine or pseudoephedrine. Don't use nasal spray decongestants more than 3 days. Longer use can make congestion worse. (Prescription nasal sprays from your doctor don't typically have those restrictions.)  · Antihistamines may help if you are also having allergy symptoms.  · You may use medicines such as guaifenesin to thin mucus and promote drainage.  Follow-up care  Follow up with your doctor or as advised if you are not feeling better after 3 days.  When to seek medical care  Get prompt medical attention if any of the following occur:  · Ear  pain gets worse or does not start to improve   · Fever of 100.4°F (38°C) or higher, or as directed by your health care provider  · Fluid or blood draining from the ear  · Headache or sinus pain  · Stiff neck  · Unusual drowsiness or confusion  © 5656-2618 [employer]. [address] [place] [address]. All rights reserved. This information is not intended as a substitute for professional medical care. Always follow your healthcare professional's instructions.                 No pertinent past medical history

## 2018-05-11 NOTE — ED ADULT NURSE NOTE - CHPI ED SYMPTOMS NEG
no vaginal discharge/no vomiting/no dysuria/no back pain/no discharge/no fever/no pain/no chills/no nausea/no abdominal pain

## 2018-05-16 ENCOUNTER — RESULT CHARGE (OUTPATIENT)
Age: 38
End: 2018-05-16

## 2018-05-16 ENCOUNTER — OUTPATIENT (OUTPATIENT)
Dept: OUTPATIENT SERVICES | Facility: HOSPITAL | Age: 38
LOS: 1 days | Discharge: HOME | End: 2018-05-16

## 2018-05-16 ENCOUNTER — NON-APPOINTMENT (OUTPATIENT)
Age: 38
End: 2018-05-16

## 2018-05-16 ENCOUNTER — MESSAGE (OUTPATIENT)
Age: 38
End: 2018-05-16

## 2018-05-16 ENCOUNTER — APPOINTMENT (OUTPATIENT)
Dept: OBGYN | Facility: CLINIC | Age: 38
End: 2018-05-16

## 2018-05-16 VITALS
DIASTOLIC BLOOD PRESSURE: 66 MMHG | SYSTOLIC BLOOD PRESSURE: 114 MMHG | HEART RATE: 62 BPM | RESPIRATION RATE: 20 BRPM | BODY MASS INDEX: 26.25 KG/M2 | WEIGHT: 143.5 LBS

## 2018-05-18 DIAGNOSIS — O20.0 THREATENED ABORTION: ICD-10-CM

## 2018-05-20 LAB
BILIRUB UR QL STRIP: NORMAL
CLARITY UR: CLEAR
COLLECTION METHOD: NORMAL
GLUCOSE UR-MCNC: NORMAL
HCG UR QL: 0.02 EU/DL
HCG UR QL: POSITIVE
HGB UR QL STRIP.AUTO: NORMAL
KETONES UR-MCNC: NORMAL
LEUKOCYTE ESTERASE UR QL STRIP: NORMAL
NITRITE UR QL STRIP: NORMAL
PH UR STRIP: 6
PROT UR STRIP-MCNC: NORMAL
QUALITY CONTROL: YES
SP GR UR STRIP: 1

## 2018-05-21 ENCOUNTER — LABORATORY RESULT (OUTPATIENT)
Age: 38
End: 2018-05-21

## 2018-05-22 LAB
ABO + RH PNL BLD: NORMAL
APPEARANCE: CLEAR
BASOPHILS # BLD AUTO: 0.01 K/UL
BASOPHILS NFR BLD AUTO: 0.1 %
BILIRUBIN URINE: NEGATIVE
BLD GP AB SCN SERPL QL: NORMAL
BLOOD URINE: ABNORMAL
COLOR: YELLOW
EOSINOPHIL # BLD AUTO: 0.06 K/UL
EOSINOPHIL NFR BLD AUTO: 0.8 %
GLUCOSE QUALITATIVE U: NEGATIVE MG/DL
HCT VFR BLD CALC: 40.9 %
HGB BLD-MCNC: 13.2 G/DL
HIV1+2 AB SPEC QL IA.RAPID: NONREACTIVE
IMM GRANULOCYTES NFR BLD AUTO: 0.6 %
KETONES URINE: NEGATIVE
LEUKOCYTE ESTERASE URINE: NEGATIVE
LYMPHOCYTES # BLD AUTO: 1.79 K/UL
LYMPHOCYTES NFR BLD AUTO: 24.6 %
MAN DIFF?: NORMAL
MCHC RBC-ENTMCNC: 28.9 PG
MCHC RBC-ENTMCNC: 32.3 G/DL
MCV RBC AUTO: 89.7 FL
MONOCYTES # BLD AUTO: 0.39 K/UL
MONOCYTES NFR BLD AUTO: 5.4 %
NEUTROPHILS # BLD AUTO: 4.98 K/UL
NEUTROPHILS NFR BLD AUTO: 68.5 %
NITRITE URINE: NEGATIVE
PH URINE: 5.5
PLATELET # BLD AUTO: 196 K/UL
PROTEIN URINE: NEGATIVE MG/DL
RBC # BLD: 4.56 M/UL
RBC # FLD: 12.5 %
SPECIFIC GRAVITY URINE: 1.01
T PALLIDUM AB SER QL IA: NEGATIVE
UROBILINOGEN URINE: NEGATIVE MG/DL
WBC # FLD AUTO: 7.27 K/UL

## 2018-05-23 ENCOUNTER — MESSAGE (OUTPATIENT)
Age: 38
End: 2018-05-23

## 2018-05-23 ENCOUNTER — EMERGENCY (EMERGENCY)
Facility: HOSPITAL | Age: 38
LOS: 0 days | Discharge: HOME | End: 2018-05-24
Attending: EMERGENCY MEDICINE | Admitting: EMERGENCY MEDICINE

## 2018-05-23 VITALS
DIASTOLIC BLOOD PRESSURE: 56 MMHG | RESPIRATION RATE: 18 BRPM | TEMPERATURE: 100 F | SYSTOLIC BLOOD PRESSURE: 101 MMHG | OXYGEN SATURATION: 98 % | HEART RATE: 80 BPM

## 2018-05-23 DIAGNOSIS — O20.8 OTHER HEMORRHAGE IN EARLY PREGNANCY: ICD-10-CM

## 2018-05-23 DIAGNOSIS — O03.9 COMPLETE OR UNSPECIFIED SPONTANEOUS ABORTION WITHOUT COMPLICATION: ICD-10-CM

## 2018-05-23 DIAGNOSIS — Z79.899 OTHER LONG TERM (CURRENT) DRUG THERAPY: ICD-10-CM

## 2018-05-23 LAB
ANION GAP SERPL CALC-SCNC: 17 MMOL/L — HIGH (ref 7–14)
BLD GP AB SCN SERPL QL: SIGNIFICANT CHANGE UP
BUN SERPL-MCNC: 14 MG/DL — SIGNIFICANT CHANGE UP (ref 10–20)
CALCIUM SERPL-MCNC: 9.4 MG/DL — SIGNIFICANT CHANGE UP (ref 8.5–10.1)
CHLORIDE SERPL-SCNC: 101 MMOL/L — SIGNIFICANT CHANGE UP (ref 98–110)
CO2 SERPL-SCNC: 22 MMOL/L — SIGNIFICANT CHANGE UP (ref 17–32)
CREAT SERPL-MCNC: 0.8 MG/DL — SIGNIFICANT CHANGE UP (ref 0.7–1.5)
GLUCOSE SERPL-MCNC: 120 MG/DL — HIGH (ref 70–99)
HCG SERPL-ACNC: 7474 MIU/ML — HIGH (ref 0–5)
HCT VFR BLD CALC: 35.4 % — LOW (ref 37–47)
HGB BLD-MCNC: 11.7 G/DL — LOW (ref 12–16)
MCHC RBC-ENTMCNC: 28.7 PG — SIGNIFICANT CHANGE UP (ref 27–31)
MCHC RBC-ENTMCNC: 33.1 G/DL — SIGNIFICANT CHANGE UP (ref 32–37)
MCV RBC AUTO: 86.8 FL — SIGNIFICANT CHANGE UP (ref 81–99)
NRBC # BLD: 0 /100 WBCS — SIGNIFICANT CHANGE UP (ref 0–0)
PLATELET # BLD AUTO: 182 K/UL — SIGNIFICANT CHANGE UP (ref 130–400)
POTASSIUM SERPL-MCNC: 3.8 MMOL/L — SIGNIFICANT CHANGE UP (ref 3.5–5)
POTASSIUM SERPL-SCNC: 3.8 MMOL/L — SIGNIFICANT CHANGE UP (ref 3.5–5)
RBC # BLD: 4.08 M/UL — LOW (ref 4.2–5.4)
RBC # FLD: 12.3 % — SIGNIFICANT CHANGE UP (ref 11.5–14.5)
SODIUM SERPL-SCNC: 140 MMOL/L — SIGNIFICANT CHANGE UP (ref 135–146)
TYPE + AB SCN PNL BLD: SIGNIFICANT CHANGE UP
WBC # BLD: 11.15 K/UL — HIGH (ref 4.8–10.8)
WBC # FLD AUTO: 11.15 K/UL — HIGH (ref 4.8–10.8)

## 2018-05-23 RX ORDER — SODIUM CHLORIDE 9 MG/ML
1000 INJECTION INTRAMUSCULAR; INTRAVENOUS; SUBCUTANEOUS ONCE
Qty: 0 | Refills: 0 | Status: COMPLETED | OUTPATIENT
Start: 2018-05-23 | End: 2018-05-23

## 2018-05-23 RX ORDER — SODIUM CHLORIDE 9 MG/ML
3 INJECTION INTRAMUSCULAR; INTRAVENOUS; SUBCUTANEOUS EVERY 8 HOURS
Qty: 0 | Refills: 0 | Status: DISCONTINUED | OUTPATIENT
Start: 2018-05-23 | End: 2018-05-24

## 2018-05-23 RX ADMIN — SODIUM CHLORIDE 1000 MILLILITER(S): 9 INJECTION INTRAMUSCULAR; INTRAVENOUS; SUBCUTANEOUS at 20:54

## 2018-05-23 NOTE — ED PROVIDER NOTE - MEDICAL DECISION MAKING DETAILS
11 weeks pregnant with vaginal bleeding, had nml u/s prior. now u/s shows no gestational sac with possible POCS. plan is to discuss with ob given patietn has active bleeding and POCS

## 2018-05-23 NOTE — ED PROVIDER NOTE - OBJECTIVE STATEMENT
37 y/o female Palauan speaking (translation by Gianna HUFF)  LMP  presents to the ED c/o "I've been having vaginal bleeding for 2 weeks. It has been bright red for the last week. I've been changing 2-3 pads a day. I have abdominal pain and cramping." no n/v/d/fever/ chills

## 2018-05-23 NOTE — ED PROVIDER NOTE - PROGRESS NOTE DETAILS
Spoke with Dr. Khoury of gyn will come to evaluate the patient. Discussed case with Ob-gyn resident Dr. Khoury stating that patient can fu at obgyn clinic on Tuesday. Stable for dc. pt with completed , cleared by obgyn for dc, has appt given for f/u.

## 2018-05-24 ENCOUNTER — RESULT REVIEW (OUTPATIENT)
Age: 38
End: 2018-05-24

## 2018-05-24 LAB
BACTERIA UR CULT: NORMAL
LEAD BLD-MCNC: <1 UG/DL

## 2018-05-24 RX ORDER — ACETAMINOPHEN 500 MG
975 TABLET ORAL ONCE
Qty: 0 | Refills: 0 | Status: COMPLETED | OUTPATIENT
Start: 2018-05-24 | End: 2018-05-24

## 2018-05-24 RX ADMIN — Medication 975 MILLIGRAM(S): at 01:28

## 2018-05-24 RX ADMIN — SODIUM CHLORIDE 3 MILLILITER(S): 9 INJECTION INTRAMUSCULAR; INTRAVENOUS; SUBCUTANEOUS at 01:27

## 2018-05-24 NOTE — CONSULT NOTE ADULT - ASSESSMENT
37yo  at 15w GA, by LMP 18, with likely spontaneous , likely still aborting, currently hemodynamically and clinically stable at this time   - no acute GYN intervention at this time  - f/u with GYN as outpatient in 1 week (next Tuesday)   - bleeding precautions  - motrin/tylenol PRN for pain   - D/w Dr. Muñoz  - Dispo per ED.

## 2018-05-24 NOTE — CONSULT NOTE ADULT - SUBJECTIVE AND OBJECTIVE BOX
MARICARMEN CLAIRE   38y   Female   [id number]    Chief Complaint: vaginal bleeding    HPI: 37yo  at 15w GA, by LMP 18, c/o vaginal bleeding X 2 weeks. Initially started as brownish discharge, increased over past 2 days, uses 2-3 pads/day. C/o passage of tissue at 1600- appeared to be the fetus. A/w 10/10 cramping abdominal pain, currently resolved. Presented to the ED 2 weeks ago with c/o bleeding and sonogram showed SIUP measuring 7w1d GA and subchorionic hematoma. Denies dizziness/lightheadedness/CP/SOB/palpitations. Denies fever, chills, nausea, vomiting, diarrhea/constipation, dysuria, urgency, frequency. Planned and desired pregnancy.     MEDICATIONS  (STANDING):  sodium chloride 0.9% lock flush 3 milliLiter(s) IV Push every 8 hours      No Known Allergies      PAST MEDICAL & SURGICAL HISTORY:  H/o gastric ulcer in the past- H. pylori being followed by gastro-enterologist  rhinoplasty   right breast lumpectomy- benign       OB/GYN HISTORY:   LMP: 18           Cycle Length: regular                                           Gyn: h/o conservatively managed ovarian cysts, h/o small fibroid in the past. denies history of abnormal pap, STI                                      Obstetric: SAB X 2                                       Last Pap smear: 1 year ago- WNL                                        FAMILY HISTORY:  No pertinent family history in first degree relatives      SOCIAL HISTORY: Denies cigarette use, alcohol use, or illicit drug use    Vital Signs Last 24 Hrs  T(C): 37.7 (23 May 2018 18:49), Max: 37.7 (23 May 2018 18:49)  T(F): 99.9 (23 May 2018 18:49), Max: 99.9 (23 May 2018 18:49)  HR: 80 (23 May 2018 18:49) (80 - 80)  BP: 101/56 (23 May 2018 18:49) (101/56 - 101/56)  RR: 18 (23 May 2018 18:49) (18 - 18)  SpO2: 98% (23 May 2018 18:49) (98% - 98%)    Physical Exam:  Constitutional: AAOx3, NAD  Gastrointestinal: Soft, nontender, nondistended, no rebound, guarding, or rigidity  Pelvic:   external genitalia: normal, no lesions  vagina: normal, no lesions  uterus: 7-8 w anteverted, nontender  adnexae: no palpable masses/tenderness  cervix: CMT neg, no active bleeding per os, FT/L/P  Rectal: deferred    LABS: A pos                        11.7   11.15 )-----------( 182      ( 23 May 2018 21:14 )             35.4     HCG Quantitative, Serum: 7474.0 mIU/mL (18 @ 21:14)    Antibody Screen: NEG (18 @ 21:14)        140  |  101  |  14  ----------------------------<  120<H>  3.8   |  22  |  0.8    Ca    9.4      23 May 2018 21:14              RADIOLOGY & ADDITIONAL STUDIES: UTERUS: measures 7.4 x 5.9 x 5.5 cm, with normal echogenicity and   morphology. The endometrial echo complex is heterogeneous and thickened   measuring 2.0 cm with internal vascularity. Previously seen intrauterine   gestational sac is no longer visualized.     RIGHT OVARY: measures 2.9 x 1.7 x 2.3 cm, and contains the corpus luteum   1.7 x 1.6 1.4 cm. Doppler flow is demonstrated to the right ovary.     LEFT OVARY: measures 2.5 x 2.7 x 1.1 cm, and is unremarkable. Doppler   flow is demonstrated to the left ovary.     OTHER: No free fluid in the pelvis.      IMPRESSION:    1.  The endometrial echo complex is heterogeneous and thickened measuring   2.0 cm with internal vascularity. Consistent withretained products of   conception.    2.  Previously seen intrauterine gestational sac is no longer visualized.     3.  Right ovary corpus luteum 1.7 cm.  rred

## 2018-05-25 ENCOUNTER — APPOINTMENT (OUTPATIENT)
Dept: ANTEPARTUM | Facility: CLINIC | Age: 38
End: 2018-05-25

## 2018-05-25 LAB — SURGICAL PATHOLOGY STUDY: SIGNIFICANT CHANGE UP

## 2018-05-28 LAB — CFTR MUT TESTED BLD/T: NEGATIVE

## 2018-05-29 ENCOUNTER — APPOINTMENT (OUTPATIENT)
Dept: OBGYN | Facility: CLINIC | Age: 38
End: 2018-05-29

## 2018-05-29 ENCOUNTER — OUTPATIENT (OUTPATIENT)
Dept: OUTPATIENT SERVICES | Facility: HOSPITAL | Age: 38
LOS: 1 days | Discharge: HOME | End: 2018-05-29

## 2018-05-29 ENCOUNTER — APPOINTMENT (OUTPATIENT)
Dept: ANTEPARTUM | Facility: CLINIC | Age: 38
End: 2018-05-29

## 2018-05-29 VITALS — BODY MASS INDEX: 25.61 KG/M2 | WEIGHT: 140 LBS | SYSTOLIC BLOOD PRESSURE: 100 MMHG | DIASTOLIC BLOOD PRESSURE: 60 MMHG

## 2018-05-29 DIAGNOSIS — O03.4 INCOMPLETE SPONTANEOUS ABORTION WITHOUT COMPLICATION: ICD-10-CM

## 2018-05-29 DIAGNOSIS — Z34.90 ENCOUNTER FOR SUPERVISION OF NORMAL PREGNANCY, UNSPECIFIED, UNSPECIFIED TRIMESTER: ICD-10-CM

## 2018-05-30 DIAGNOSIS — O03.4 INCOMPLETE SPONTANEOUS ABORTION WITHOUT COMPLICATION: ICD-10-CM

## 2018-06-05 ENCOUNTER — APPOINTMENT (OUTPATIENT)
Dept: ANTEPARTUM | Facility: CLINIC | Age: 38
End: 2018-06-05

## 2018-06-05 ENCOUNTER — OUTPATIENT (OUTPATIENT)
Dept: OUTPATIENT SERVICES | Facility: HOSPITAL | Age: 38
LOS: 1 days | Discharge: HOME | End: 2018-06-05

## 2018-06-05 ENCOUNTER — APPOINTMENT (OUTPATIENT)
Dept: OBGYN | Facility: CLINIC | Age: 38
End: 2018-06-05

## 2018-06-05 VITALS
DIASTOLIC BLOOD PRESSURE: 60 MMHG | WEIGHT: 139.38 LBS | BODY MASS INDEX: 25.49 KG/M2 | SYSTOLIC BLOOD PRESSURE: 102 MMHG

## 2018-06-05 DIAGNOSIS — O03.4 INCOMPLETE SPONTANEOUS ABORTION W/OUT COMPLICATION: ICD-10-CM

## 2018-06-07 DIAGNOSIS — O03.4 INCOMPLETE SPONTANEOUS ABORTION WITHOUT COMPLICATION: ICD-10-CM

## 2018-06-20 ENCOUNTER — EMERGENCY (EMERGENCY)
Facility: HOSPITAL | Age: 38
LOS: 0 days | Discharge: HOME | End: 2018-06-20
Attending: EMERGENCY MEDICINE | Admitting: EMERGENCY MEDICINE

## 2018-06-20 VITALS
OXYGEN SATURATION: 99 % | DIASTOLIC BLOOD PRESSURE: 60 MMHG | RESPIRATION RATE: 18 BRPM | TEMPERATURE: 98 F | HEART RATE: 72 BPM | SYSTOLIC BLOOD PRESSURE: 104 MMHG

## 2018-06-20 VITALS
RESPIRATION RATE: 18 BRPM | SYSTOLIC BLOOD PRESSURE: 95 MMHG | OXYGEN SATURATION: 98 % | TEMPERATURE: 99 F | HEART RATE: 64 BPM | DIASTOLIC BLOOD PRESSURE: 59 MMHG

## 2018-06-20 DIAGNOSIS — N89.9 NONINFLAMMATORY DISORDER OF VAGINA, UNSPECIFIED: ICD-10-CM

## 2018-06-20 DIAGNOSIS — R10.2 PELVIC AND PERINEAL PAIN: ICD-10-CM

## 2018-06-20 LAB
ANION GAP SERPL CALC-SCNC: 12 MMOL/L — SIGNIFICANT CHANGE UP (ref 7–14)
APPEARANCE UR: (no result)
BACTERIA # UR AUTO: (no result) /HPF
BILIRUB UR-MCNC: NEGATIVE — SIGNIFICANT CHANGE UP
BUN SERPL-MCNC: 17 MG/DL — SIGNIFICANT CHANGE UP (ref 10–20)
CALCIUM SERPL-MCNC: 8.8 MG/DL — SIGNIFICANT CHANGE UP (ref 8.5–10.1)
CHLORIDE SERPL-SCNC: 105 MMOL/L — SIGNIFICANT CHANGE UP (ref 98–110)
CO2 SERPL-SCNC: 24 MMOL/L — SIGNIFICANT CHANGE UP (ref 17–32)
COLOR SPEC: YELLOW — SIGNIFICANT CHANGE UP
CREAT SERPL-MCNC: 0.7 MG/DL — SIGNIFICANT CHANGE UP (ref 0.7–1.5)
DIFF PNL FLD: (no result)
GLUCOSE SERPL-MCNC: 81 MG/DL — SIGNIFICANT CHANGE UP (ref 70–99)
GLUCOSE UR QL: NEGATIVE MG/DL — SIGNIFICANT CHANGE UP
HCG SERPL-ACNC: 1.2 MIU/ML — SIGNIFICANT CHANGE UP
HCT VFR BLD CALC: 35.3 % — LOW (ref 37–47)
HGB BLD-MCNC: 11.3 G/DL — LOW (ref 12–16)
KETONES UR-MCNC: NEGATIVE — SIGNIFICANT CHANGE UP
LEUKOCYTE ESTERASE UR-ACNC: (no result)
MCHC RBC-ENTMCNC: 28.9 PG — SIGNIFICANT CHANGE UP (ref 27–31)
MCHC RBC-ENTMCNC: 32 G/DL — SIGNIFICANT CHANGE UP (ref 32–37)
MCV RBC AUTO: 90.3 FL — SIGNIFICANT CHANGE UP (ref 81–99)
NITRITE UR-MCNC: NEGATIVE — SIGNIFICANT CHANGE UP
NRBC # BLD: 0 /100 WBCS — SIGNIFICANT CHANGE UP (ref 0–0)
PH UR: 8 — SIGNIFICANT CHANGE UP (ref 5–8)
PLATELET # BLD AUTO: 212 K/UL — SIGNIFICANT CHANGE UP (ref 130–400)
POTASSIUM SERPL-MCNC: 4.4 MMOL/L — SIGNIFICANT CHANGE UP (ref 3.5–5)
POTASSIUM SERPL-SCNC: 4.4 MMOL/L — SIGNIFICANT CHANGE UP (ref 3.5–5)
PROT UR-MCNC: 30 MG/DL
RBC # BLD: 3.91 M/UL — LOW (ref 4.2–5.4)
RBC # FLD: 13.1 % — SIGNIFICANT CHANGE UP (ref 11.5–14.5)
SODIUM SERPL-SCNC: 141 MMOL/L — SIGNIFICANT CHANGE UP (ref 135–146)
SP GR SPEC: 1.02 — SIGNIFICANT CHANGE UP (ref 1.01–1.03)
UROBILINOGEN FLD QL: 1 MG/DL (ref 0.2–0.2)
WBC # BLD: 8.43 K/UL — SIGNIFICANT CHANGE UP (ref 4.8–10.8)
WBC # FLD AUTO: 8.43 K/UL — SIGNIFICANT CHANGE UP (ref 4.8–10.8)

## 2018-06-20 RX ORDER — FLUCONAZOLE 150 MG/1
150 TABLET ORAL ONCE
Qty: 0 | Refills: 0 | Status: COMPLETED | OUTPATIENT
Start: 2018-06-20 | End: 2018-06-20

## 2018-06-20 RX ORDER — SODIUM CHLORIDE 9 MG/ML
1000 INJECTION INTRAMUSCULAR; INTRAVENOUS; SUBCUTANEOUS ONCE
Qty: 0 | Refills: 0 | Status: COMPLETED | OUTPATIENT
Start: 2018-06-20 | End: 2018-06-20

## 2018-06-20 RX ADMIN — FLUCONAZOLE 150 MILLIGRAM(S): 150 TABLET ORAL at 20:27

## 2018-06-20 RX ADMIN — SODIUM CHLORIDE 1000 MILLILITER(S): 9 INJECTION INTRAMUSCULAR; INTRAVENOUS; SUBCUTANEOUS at 19:25

## 2018-06-20 NOTE — CONSULT NOTE ADULT - SUBJECTIVE AND OBJECTIVE BOX
HPI: 37 yo  s/p SAB at approximately 4 weeks ago presents with vaginal discharge for 2 weeks and vaginal itching for 4 days. She had a SAB 4 weeks ago after which she had 2 weeks of bleeding, was offered a D&C in the outpatient setting, and declined. She then had resolution of bleeding for 3 days followed by period-like bleeding for approximately 1 week, followed by thick brown discharge that turned to thick and white over the last 4 days. She has had worsening vaginal itching and burning over the last 4 days, which was her main reason for coming to the ED today. She denies chest pain, SOB, fevers, chills, abdominal pain, hematuria, diarrhea, or constipation. She reports some external burning after urinating but denies bladder pain.    She is actively trying to get pregnant.    Home Medications:  Prenatal vitamins    Allergies  No Known Allergies    PAST MEDICAL & SURGICAL HISTORY:  Miscarriage  H pylori ulcer  No significant past surgical history    OB/GYN HISTORY:  LMP: Unknown, possibly 2 weeks ago           Cycle Length: monthly, regular             Days Flow: 5-7  Gyn: denies history of STI, ovarian cysts, or uterine fibroids  Obstetric:     Last Pap smear: ASCUS HPV HR negative 18    FAMILY HISTORY:  No pertinent family history in first degree relatives      SOCIAL HISTORY: Denies cigarette use, alcohol use, or illicit drug use    Vital Signs Last 24 Hrs  T(F): 98.6 (2018 16:30), Max: 98.6 (2018 16:30)  HR: 64 (2018 16:30) (64 - 64)  BP: 95/59 (2018 16:30) (95/59 - 95/59)  RR: 18 (2018 16:30) (18 - 18)    Physical Exam:  Constitutional: AAOx3, NAD  Respiratory: CTAB  Cardiovascular: NL S1/S2  Gastrointestinal: Soft, nontender, nondistended, no rebound, guarding, or rigidity  Pelvic: Normal vulva, normal vagina, no external lesions. Cervix normal in appearance, closed, no bleeding, thick white discharge present, no foul smell, no CMT. Uterus anteverted, normal sized, no fundal tenderness, no adnexal masses or tenderness    LABS:                        11.3   8.43  )-----------( 212      ( 2018 19:52 )             35.3     HCG Quantitative, Serum: 1.2 mIU/mL (18 @ 21:09)          141  |  105  |  17  ----------------------------<  81  4.4   |  24  |  0.7    Ca    8.8      2018 19:52        Urinalysis Basic - ( 2018 17:30 )    Color: Yellow / Appearance: Turbid / S.025 / pH: x  Gluc: x / Ketone: Negative  / Bili: Negative / Urobili: 1.0 mg/dL   Blood: x / Protein: 30 mg/dL / Nitrite: Negative   Leuk Esterase: Large / RBC: x / WBC x   Sq Epi: x / Non Sq Epi: x / Bacteria: Many /HPF          RADIOLOGY & ADDITIONAL STUDIES:  < from: US Pelvis Complete (18 @ 19:55) >  LMP: 2018    FINDINGS:    UTERUS: Normal in echogenicity and morphology measuring 7.8 x 6.2 x 4.4   cm. Theendometrial echo complex is heterogeneous and thickened,   measuring 1.5 cm, with internal vascularity. Uterine gestational sac is   identified.    RIGHT OVARY: measures 2.8 x 2.1 x 1.6 cm, and is unremarkable. Doppler   flow is demonstrated to the right ovary.     LEFT OVARY: measures 2.7 x 2.4 x 1.6 cm, and is unremarkable. Doppler   flow is demonstrated to the left ovary.     OTHER: No free fluid in the pelvis.    IMPRESSION:  Heterogeneous, thickened endometrial complex with internal vascularity   measuring 1.5 cm. Findings are consistent with retained products of   conception.    < end of copied text > HPI: 37 yo  s/p SAB approximately 4 weeks ago presents with vaginal discharge for 2 weeks and vaginal itching for 4 days. She had a SAB 4 weeks ago after which she had 2 weeks of bleeding, was offered a D&C in the outpatient setting, and declined. She then had resolution of bleeding for 3 days followed by period-like bleeding for approximately 1 week, followed by thick brown discharge that turned to thick and white over the last 4 days. She has had worsening vaginal itching and burning over the last 4 days, which was her main reason for coming to the ED today. She denies chest pain, SOB, fevers, chills, abdominal pain, hematuria, diarrhea, or constipation. She reports some external burning after urinating but denies bladder pain.    She is actively trying to get pregnant.    Home Medications:  Prenatal vitamins    Allergies  No Known Allergies    PAST MEDICAL & SURGICAL HISTORY:  Miscarriage  H pylori ulcer  No significant past surgical history    OB/GYN HISTORY:  LMP: Unknown, possibly 2 weeks ago           Cycle Length: monthly, regular             Days Flow: 5-7  Gyn: denies history of STI, ovarian cysts, or uterine fibroids  Obstetric:     Last Pap smear: ASCUS HPV HR negative 18    FAMILY HISTORY:  No pertinent family history in first degree relatives      SOCIAL HISTORY: Denies cigarette use, alcohol use, or illicit drug use    Vital Signs Last 24 Hrs  T(F): 98.6 (2018 16:30), Max: 98.6 (2018 16:30)  HR: 64 (2018 16:30) (64 - 64)  BP: 95/59 (2018 16:30) (95/59 - 95/59)  RR: 18 (2018 16:30) (18 - 18)    Physical Exam:  Constitutional: AAOx3, NAD  Respiratory: CTAB  Cardiovascular: NL S1/S2  Gastrointestinal: Soft, nontender, nondistended, no rebound, guarding, or rigidity  Pelvic: Normal vulva, normal vagina, no external lesions. Cervix normal in appearance, closed, no bleeding, thick white discharge present, no foul smell, no CMT. Uterus anteverted, normal sized, no fundal tenderness, no adnexal masses or tenderness    LABS:                        11.3   8.43  )-----------( 212      ( 2018 19:52 )             35.3     HCG Quantitative, Serum: 1.2 mIU/mL (18 @ 21:09)          141  |  105  |  17  ----------------------------<  81  4.4   |  24  |  0.7    Ca    8.8      2018 19:52        Urinalysis Basic - ( 2018 17:30 )    Color: Yellow / Appearance: Turbid / S.025 / pH: x  Gluc: x / Ketone: Negative  / Bili: Negative / Urobili: 1.0 mg/dL   Blood: x / Protein: 30 mg/dL / Nitrite: Negative   Leuk Esterase: Large / RBC: x / WBC x   Sq Epi: x / Non Sq Epi: x / Bacteria: Many /HPF          RADIOLOGY & ADDITIONAL STUDIES:  < from: US Pelvis Complete (18 @ 19:55) >  LMP: 2018    FINDINGS:    UTERUS: Normal in echogenicity and morphology measuring 7.8 x 6.2 x 4.4   cm. Theendometrial echo complex is heterogeneous and thickened,   measuring 1.5 cm, with internal vascularity. Uterine gestational sac is   identified.    RIGHT OVARY: measures 2.8 x 2.1 x 1.6 cm, and is unremarkable. Doppler   flow is demonstrated to the right ovary.     LEFT OVARY: measures 2.7 x 2.4 x 1.6 cm, and is unremarkable. Doppler   flow is demonstrated to the left ovary.     OTHER: No free fluid in the pelvis.    IMPRESSION:  Heterogeneous, thickened endometrial complex with internal vascularity   measuring 1.5 cm. Findings are consistent with retained products of   conception.    < end of copied text > [place]  ID [id number] & [id number]    HPI: 37 yo  s/p SAB approximately 4 weeks ago presents with vaginal discharge for 2 weeks and vaginal itching for 4 days. She had a SAB 4 weeks ago after which she had 2 weeks of bleeding, was offered a D&C in the outpatient setting, and declined. She then had resolution of bleeding for 3 days followed by period-like bleeding for approximately 1 week, followed by thick brown discharge that turned to thick and white over the last 4 days. She has had worsening vaginal itching and burning over the last 4 days, which was her main reason for coming to the ED today. She denies chest pain, SOB, fevers, chills, abdominal pain, hematuria, diarrhea, or constipation. She reports some external burning after urinating but denies bladder pain.    She is actively trying to get pregnant.    Home Medications:  Prenatal vitamins    Allergies  No Known Allergies    PAST MEDICAL & SURGICAL HISTORY:  Miscarriage  H pylori ulcer  No significant past surgical history    OB/GYN HISTORY:  LMP: Unknown, possibly 2 weeks ago           Cycle Length: monthly, regular             Days Flow: 5-7  Gyn: denies history of STI, ovarian cysts, or uterine fibroids  Obstetric:     Last Pap smear: ASCUS HPV HR negative 18    FAMILY HISTORY:  No pertinent family history in first degree relatives      SOCIAL HISTORY: Denies cigarette use, alcohol use, or illicit drug use    Vital Signs Last 24 Hrs  T(F): 98.6 (2018 16:30), Max: 98.6 (2018 16:30)  HR: 64 (2018 16:30) (64 - 64)  BP: 95/59 (2018 16:30) (95/59 - 95/59)  RR: 18 (2018 16:30) (18 - 18)    Physical Exam:  Constitutional: AAOx3, NAD  Respiratory: CTAB  Cardiovascular: NL S1/S2  Gastrointestinal: Soft, nontender, nondistended, no rebound, guarding, or rigidity  Pelvic: Normal vulva, normal vagina, no external lesions. Cervix normal in appearance, closed, no bleeding, thick white discharge present, no foul smell, no CMT. Uterus anteverted, normal sized, no fundal tenderness, no adnexal masses or tenderness    LABS:                        11.3   8.43  )-----------( 212      ( 2018 19:52 )             35.3     HCG Quantitative, Serum: 1.2 mIU/mL (18 @ 21:09)          141  |  105  |  17  ----------------------------<  81  4.4   |  24  |  0.7    Ca    8.8      2018 19:52        Urinalysis Basic - ( 2018 17:30 )    Color: Yellow / Appearance: Turbid / S.025 / pH: x  Gluc: x / Ketone: Negative  / Bili: Negative / Urobili: 1.0 mg/dL   Blood: x / Protein: 30 mg/dL / Nitrite: Negative   Leuk Esterase: Large / RBC: x / WBC x   Sq Epi: x / Non Sq Epi: x / Bacteria: Many /HPF          RADIOLOGY & ADDITIONAL STUDIES:  < from: US Pelvis Complete (18 @ 19:55) >  LMP: 2018    FINDINGS:    UTERUS: Normal in echogenicity and morphology measuring 7.8 x 6.2 x 4.4   cm. Theendometrial echo complex is heterogeneous and thickened,   measuring 1.5 cm, with internal vascularity. Uterine gestational sac is   identified.    RIGHT OVARY: measures 2.8 x 2.1 x 1.6 cm, and is unremarkable. Doppler   flow is demonstrated to the right ovary.     LEFT OVARY: measures 2.7 x 2.4 x 1.6 cm, and is unremarkable. Doppler   flow is demonstrated to the left ovary.     OTHER: No free fluid in the pelvis.    IMPRESSION:  Heterogeneous, thickened endometrial complex with internal vascularity   measuring 1.5 cm. Findings are consistent with retained products of   conception.    < end of copied text >

## 2018-06-20 NOTE — CONSULT NOTE ADULT - ASSESSMENT
39 yo  s/p SAB at approximately 4 weeks ago with likely candida infection  -GC/CT and vaginitis panel collected, will f/u results  -recommend terazol 7 for likely candidiasis  -f/u at [facility] for symptomatic assessment and preconception counseling  -disposition per ED     Discussed with Dr. Noble and Dr. Abreu 39 yo  s/p SAB approximately 4 weeks ago, with negative BHCG, with likely candida infection  -GC/CT and vaginitis panel collected, will f/u results  -recommend terazol 7 for likely candidiasis  -f/u at [facility] for symptomatic assessment and preconception counseling  -disposition per ED     Discussed with Dr. Noble and Dr. Abreu

## 2018-06-20 NOTE — ED ADULT NURSE NOTE - OBJECTIVE STATEMENT
pt comes in with complaints of vaginal bleeding since 5/24 when she had a miscarriage.  pt states she is having little bleeding at this time but has burning and itching in her vaginal area. pt having nausea and slight dizziness. requesting sono to see if there is anything retained.

## 2018-06-20 NOTE — ED PROVIDER NOTE - OBJECTIVE STATEMENT
37 y/o [place]i speaking female s/p Miscarriage last month presents to the ED c/o "I'm still having bleeding though less. I have vaginal discharge with itching and pelvic pain. I also feel weak. I followed up with GYN clinic 2 weeks ago." no fever/ chills

## 2018-06-20 NOTE — ED PROVIDER NOTE - MEDICAL DECISION MAKING DETAILS
All studies reviewed at length w/ pt in [place] ( Dr. Juárez) and OBGYN.  Pt relieved and eager for d/c.  Will f/u w/ women's clinic in 2 days for re-eval.  Pt verbalized return precautions.   Pt treated for yeast infection in ED.  Pt advised to fill RX if Sx continue.  Pt given a copy of all studies for f/u

## 2018-06-20 NOTE — ED ADULT TRIAGE NOTE - CHIEF COMPLAINT QUOTE
patient had miscarriage 1 month ago . now complaining of burning with urination and dizziness . patient reports small amount of vaginal bleeding

## 2018-06-20 NOTE — ED PROVIDER NOTE - ATTENDING CONTRIBUTION TO CARE
38 YOF s/p miscarriage last month p/w persistent scant bleeding, vaginal d/c and itching and mild pelvic pain.  Per pt, she followed up w/ GYN 2 weeks ago.  Denies F/C/N/V/D/HA/FOCAL WEAKNESS/CONFUSION.  Denies urinary sx.      Takes prenatal vitamins.      Impression: persistent scant bleeding s/p spontaneous miscarriage w/o D&C.  DDx: Retained POC vs pregnancy vs other.    Plan: Labs, pelvic exam, pelvic US, OBGYN consult

## 2018-06-21 LAB
C TRACH RRNA SPEC QL NAA+PROBE: SIGNIFICANT CHANGE UP
CULTURE RESULTS: NO GROWTH — SIGNIFICANT CHANGE UP
N GONORRHOEA RRNA SPEC QL NAA+PROBE: SIGNIFICANT CHANGE UP
SPECIMEN SOURCE: SIGNIFICANT CHANGE UP
SPECIMEN SOURCE: SIGNIFICANT CHANGE UP

## 2018-07-02 LAB
HCG SERPL QL: POSITIVE
PAPP-A SERPL-ACNC: 260 MIU/ML

## 2018-08-29 PROBLEM — K27.9 PEPTIC ULCER, SITE UNSPECIFIED, UNSPECIFIED AS ACUTE OR CHRONIC, WITHOUT HEMORRHAGE OR PERFORATION: Chronic | Status: ACTIVE | Noted: 2018-06-20

## 2018-08-29 PROBLEM — O03.9 COMPLETE OR UNSPECIFIED SPONTANEOUS ABORTION WITHOUT COMPLICATION: Chronic | Status: ACTIVE | Noted: 2018-06-20

## 2018-09-05 ENCOUNTER — MESSAGE (OUTPATIENT)
Age: 38
End: 2018-09-05

## 2018-09-06 ENCOUNTER — RESULT REVIEW (OUTPATIENT)
Age: 38
End: 2018-09-06

## 2018-09-06 ENCOUNTER — OUTPATIENT (OUTPATIENT)
Dept: OUTPATIENT SERVICES | Facility: HOSPITAL | Age: 38
LOS: 1 days | Discharge: HOME | End: 2018-09-06

## 2018-09-06 VITALS
DIASTOLIC BLOOD PRESSURE: 55 MMHG | SYSTOLIC BLOOD PRESSURE: 96 MMHG | RESPIRATION RATE: 18 BRPM | TEMPERATURE: 98 F | HEART RATE: 64 BPM | WEIGHT: 134.92 LBS | HEIGHT: 63 IN

## 2018-09-06 VITALS — DIASTOLIC BLOOD PRESSURE: 57 MMHG | HEART RATE: 56 BPM | SYSTOLIC BLOOD PRESSURE: 104 MMHG | RESPIRATION RATE: 18 BRPM

## 2018-09-06 DIAGNOSIS — Z98.890 OTHER SPECIFIED POSTPROCEDURAL STATES: Chronic | ICD-10-CM

## 2018-09-06 NOTE — H&P PST ADULT - HISTORY OF PRESENT ILLNESS
37 yo female patient with H Pylori s/p failed triple therapy s/p quadruple therapy, had egd in 4/2017 showing gastritis and focal intestinal metaplasia, p/here for antral mapping

## 2018-09-06 NOTE — ASU DISCHARGE PLAN (ADULT/PEDIATRIC). - SPECIAL INSTRUCTIONS
Your endoscopy was normal and multiple biopsies were taken.  Please follow up in the [facility] in the next 2-4 wks for the biopsy results.  No driving for the next 24 hrs.

## 2018-09-06 NOTE — ASU DISCHARGE PLAN (ADULT/PEDIATRIC). - NOTIFY
Pain not relieved by Medications/Fever greater than 101/Bleeding that does not stop/Excessive Diarrhea/Unable to Urinate/Swelling that continues/Persistent Nausea and Vomiting/Inability to Tolerate Liquids or Foods

## 2018-09-10 LAB — SURGICAL PATHOLOGY STUDY: SIGNIFICANT CHANGE UP

## 2018-09-12 DIAGNOSIS — K59.09 OTHER CONSTIPATION: ICD-10-CM

## 2018-09-12 DIAGNOSIS — K21.9 GASTRO-ESOPHAGEAL REFLUX DISEASE WITHOUT ESOPHAGITIS: ICD-10-CM

## 2018-09-12 DIAGNOSIS — K29.50 UNSPECIFIED CHRONIC GASTRITIS WITHOUT BLEEDING: ICD-10-CM

## 2018-09-12 DIAGNOSIS — B37.3 CANDIDIASIS OF VULVA AND VAGINA: ICD-10-CM

## 2018-09-12 DIAGNOSIS — K31.89 OTHER DISEASES OF STOMACH AND DUODENUM: ICD-10-CM

## 2018-09-12 DIAGNOSIS — N91.5 OLIGOMENORRHEA, UNSPECIFIED: ICD-10-CM

## 2018-09-12 DIAGNOSIS — E55.9 VITAMIN D DEFICIENCY, UNSPECIFIED: ICD-10-CM

## 2018-09-12 DIAGNOSIS — L70.9 ACNE, UNSPECIFIED: ICD-10-CM

## 2018-09-12 DIAGNOSIS — B96.81 HELICOBACTER PYLORI [H. PYLORI] AS THE CAUSE OF DISEASES CLASSIFIED ELSEWHERE: ICD-10-CM

## 2018-09-12 DIAGNOSIS — E22.1 HYPERPROLACTINEMIA: ICD-10-CM

## 2018-10-05 ENCOUNTER — APPOINTMENT (OUTPATIENT)
Dept: GASTROENTEROLOGY | Facility: CLINIC | Age: 38
End: 2018-10-05

## 2018-10-05 ENCOUNTER — OUTPATIENT (OUTPATIENT)
Dept: OUTPATIENT SERVICES | Facility: HOSPITAL | Age: 38
LOS: 1 days | Discharge: HOME | End: 2018-10-05

## 2018-10-05 VITALS
BODY MASS INDEX: 24.11 KG/M2 | DIASTOLIC BLOOD PRESSURE: 70 MMHG | WEIGHT: 131 LBS | HEART RATE: 79 BPM | HEIGHT: 62 IN | SYSTOLIC BLOOD PRESSURE: 112 MMHG

## 2018-10-05 DIAGNOSIS — Z98.890 OTHER SPECIFIED POSTPROCEDURAL STATES: Chronic | ICD-10-CM

## 2019-02-14 ENCOUNTER — APPOINTMENT (OUTPATIENT)
Dept: INTERNAL MEDICINE | Facility: CLINIC | Age: 39
End: 2019-02-14

## 2019-02-21 ENCOUNTER — OUTPATIENT (OUTPATIENT)
Dept: OUTPATIENT SERVICES | Facility: HOSPITAL | Age: 39
LOS: 1 days | Discharge: HOME | End: 2019-02-21

## 2019-02-21 ENCOUNTER — APPOINTMENT (OUTPATIENT)
Dept: INTERNAL MEDICINE | Facility: CLINIC | Age: 39
End: 2019-02-21

## 2019-02-21 VITALS
BODY MASS INDEX: 23.92 KG/M2 | WEIGHT: 130 LBS | TEMPERATURE: 96.4 F | SYSTOLIC BLOOD PRESSURE: 112 MMHG | HEIGHT: 62 IN | DIASTOLIC BLOOD PRESSURE: 67 MMHG | HEART RATE: 79 BPM

## 2019-02-21 DIAGNOSIS — Z98.890 OTHER SPECIFIED POSTPROCEDURAL STATES: Chronic | ICD-10-CM

## 2019-02-21 DIAGNOSIS — R53.83 OTHER FATIGUE: ICD-10-CM

## 2019-02-21 DIAGNOSIS — Z00.00 ENCOUNTER FOR GENERAL ADULT MEDICAL EXAMINATION WITHOUT ABNORMAL FINDINGS: ICD-10-CM

## 2019-02-21 RX ORDER — DOXYCYCLINE HYCLATE 100 MG/1
100 CAPSULE ORAL
Qty: 28 | Refills: 0 | Status: DISCONTINUED | COMMUNITY
Start: 2018-10-05 | End: 2019-02-21

## 2019-02-21 RX ORDER — FAMOTIDINE 20 MG/1
20 TABLET, FILM COATED ORAL
Qty: 56 | Refills: 0 | Status: DISCONTINUED | COMMUNITY
Start: 2018-10-05 | End: 2019-02-21

## 2019-02-21 RX ORDER — BISMUTH SUBSALICYLATE 262 MG/1
262 TABLET, CHEWABLE ORAL 4 TIMES DAILY
Qty: 112 | Refills: 0 | Status: DISCONTINUED | COMMUNITY
Start: 2018-10-05 | End: 2019-02-21

## 2019-02-21 RX ORDER — METRONIDAZOLE 250 MG/1
250 TABLET ORAL EVERY 6 HOURS
Qty: 56 | Refills: 0 | Status: DISCONTINUED | COMMUNITY
Start: 2018-10-05 | End: 2019-02-21

## 2019-02-21 RX ORDER — POLYETHYLENE GLYCOL 3350 AND ELECTROLYTES WITH LEMON FLAVOR 236; 22.74; 6.74; 5.86; 2.97 G/4L; G/4L; G/4L; G/4L; G/4L
236 POWDER, FOR SOLUTION ORAL
Qty: 1 | Refills: 0 | Status: DISCONTINUED | COMMUNITY
Start: 2018-10-05 | End: 2019-02-21

## 2019-02-21 NOTE — ASSESSMENT
[FreeTextEntry1] : #) h/o H Pylori: Pt is asymptomatic. Check H Pylori antigen. \par \par #) h/o fibroadenoma: Repeat mammogram. \par \par #)Fatigue: Check Thyroid function tests\par \par #) HCM: Will labs. \par Refer to gyne for pap smear. \par Refused flu shot. \par \par RTC in 6 months.

## 2019-02-21 NOTE — PHYSICAL EXAM
[No Acute Distress] : no acute distress [Well Nourished] : well nourished [Well Developed] : well developed [Normal Sclera/Conjunctiva] : normal sclera/conjunctiva [PERRL] : pupils equal round and reactive to light [EOMI] : extraocular movements intact [Normal Outer Ear/Nose] : the outer ears and nose were normal in appearance [Normal Oropharynx] : the oropharynx was normal [No JVD] : no jugular venous distention [Supple] : supple [Thyroid Normal, No Nodules] : the thyroid was normal and there were no nodules present [No Respiratory Distress] : no respiratory distress  [Clear to Auscultation] : lungs were clear to auscultation bilaterally [No Accessory Muscle Use] : no accessory muscle use [Normal Rate] : normal rate  [Regular Rhythm] : with a regular rhythm [Normal S1, S2] : normal S1 and S2 [Soft] : abdomen soft [Non Tender] : non-tender [Non-distended] : non-distended [No CVA Tenderness] : no CVA  tenderness [No Spinal Tenderness] : no spinal tenderness [No Rash] : no rash [Normal Gait] : normal gait [Coordination Grossly Intact] : coordination grossly intact [No Focal Deficits] : no focal deficits [Normal Affect] : the affect was normal [Normal Mood] : the mood was normal [Normal Insight/Judgement] : insight and judgment were intact

## 2019-02-21 NOTE — HISTORY OF PRESENT ILLNESS
[FreeTextEntry1] : f/up visit.  [de-identified] : Pt came for a f/up visit. c/o fatigue and occasional dizziness after eating sweets. She denies any stomach problems. Pt is going to f/up with GI for colonoscopy. Other ROS were unremarkable.

## 2019-04-02 ENCOUNTER — LABORATORY RESULT (OUTPATIENT)
Age: 39
End: 2019-04-02

## 2019-04-05 ENCOUNTER — OUTPATIENT (OUTPATIENT)
Dept: OUTPATIENT SERVICES | Facility: HOSPITAL | Age: 39
LOS: 1 days | Discharge: HOME | End: 2019-04-05

## 2019-04-05 ENCOUNTER — TRANSCRIPTION ENCOUNTER (OUTPATIENT)
Age: 39
End: 2019-04-05

## 2019-04-05 VITALS
WEIGHT: 134.04 LBS | DIASTOLIC BLOOD PRESSURE: 51 MMHG | HEIGHT: 63 IN | RESPIRATION RATE: 20 BRPM | TEMPERATURE: 98 F | HEART RATE: 60 BPM | SYSTOLIC BLOOD PRESSURE: 102 MMHG

## 2019-04-05 VITALS
HEART RATE: 78 BPM | RESPIRATION RATE: 18 BRPM | OXYGEN SATURATION: 99 % | SYSTOLIC BLOOD PRESSURE: 114 MMHG | DIASTOLIC BLOOD PRESSURE: 63 MMHG

## 2019-04-05 DIAGNOSIS — Z98.890 OTHER SPECIFIED POSTPROCEDURAL STATES: Chronic | ICD-10-CM

## 2019-04-05 RX ORDER — HYDROCORTISONE 1 %
1 OINTMENT (GRAM) TOPICAL
Qty: 10 | Refills: 0 | OUTPATIENT
Start: 2019-04-05 | End: 2019-04-14

## 2019-04-05 NOTE — CHART NOTE - NSCHARTNOTEFT_GEN_A_CORE
PACU ANESTHESIA ADMISSION NOTE      Procedure: colonoscopy  Post op diagnosis:  blood in stool    ____  Intubated  TV:______       Rate: ______      FiO2: ______    _x___  Patent Airway    _x___  Full return of protective reflexes    _x___  Full recovery from anesthesia / back to baseline status    Vitals:            T:                BP : 86/50               R: 18             Sat: 100              P92      Mental Status:  _x___ Awake   _____ Alert   _____ Drowsy   _____ Sedated    Nausea/Vomiting:  _x___  NO       ______Yes,   See Post - Op Orders         Pain Scale (0-10):  __0___    Treatment: _x___ None    ____ See Post - Op/PCA Orders    Post - Operative Fluids:   __x__ Oral   ____ See Post - Op Orders    Plan: Discharge:   _x___Home       _____Floor     _____Critical Care    _____  Other:_________________    Comments:  No anesthesia issues or complications noted.  Discharge when criteria met.

## 2019-04-09 DIAGNOSIS — K92.1 MELENA: ICD-10-CM

## 2019-04-09 DIAGNOSIS — K64.8 OTHER HEMORRHOIDS: ICD-10-CM

## 2019-04-10 LAB
25(OH)D3 SERPL-MCNC: 22 NG/ML
ALBUMIN SERPL ELPH-MCNC: 4.5 G/DL
ALP BLD-CCNC: 51 U/L
ALT SERPL-CCNC: 17 U/L
ANION GAP SERPL CALC-SCNC: 11 MMOL/L
AST SERPL-CCNC: 16 U/L
BASOPHILS # BLD AUTO: 0.02 K/UL
BASOPHILS NFR BLD AUTO: 0.3 %
BILIRUB SERPL-MCNC: 0.3 MG/DL
BUN SERPL-MCNC: 17 MG/DL
CALCIUM SERPL-MCNC: 9.2 MG/DL
CHLORIDE SERPL-SCNC: 103 MMOL/L
CHOLEST SERPL-MCNC: 196 MG/DL
CHOLEST/HDLC SERPL: 4 RATIO
CO2 SERPL-SCNC: 23 MMOL/L
CREAT SERPL-MCNC: 0.6 MG/DL
EOSINOPHIL # BLD AUTO: 0.05 K/UL
EOSINOPHIL NFR BLD AUTO: 0.7 %
GLUCOSE SERPL-MCNC: 95 MG/DL
H PYLORI AG STL QL: NOT DETECTED
HCT VFR BLD CALC: 41.4 %
HDLC SERPL-MCNC: 49 MG/DL
HGB BLD-MCNC: 13.3 G/DL
IMM GRANULOCYTES NFR BLD AUTO: 0.4 %
LDLC SERPL CALC-MCNC: 135 MG/DL
LYMPHOCYTES # BLD AUTO: 2.01 K/UL
LYMPHOCYTES NFR BLD AUTO: 27.7 %
MAN DIFF?: NORMAL
MCHC RBC-ENTMCNC: 29 PG
MCHC RBC-ENTMCNC: 32.1 G/DL
MCV RBC AUTO: 90.4 FL
MONOCYTES # BLD AUTO: 0.46 K/UL
MONOCYTES NFR BLD AUTO: 6.3 %
NEUTROPHILS # BLD AUTO: 4.68 K/UL
NEUTROPHILS NFR BLD AUTO: 64.6 %
PLATELET # BLD AUTO: 230 K/UL
POTASSIUM SERPL-SCNC: 4.6 MMOL/L
PROT SERPL-MCNC: 6.9 G/DL
RBC # BLD: 4.58 M/UL
RBC # FLD: 12.9 %
SODIUM SERPL-SCNC: 137 MMOL/L
TRIGL SERPL-MCNC: 110 MG/DL
TSH SERPL-ACNC: 2.05 UIU/ML
WBC # FLD AUTO: 7.25 K/UL

## 2019-05-07 ENCOUNTER — APPOINTMENT (OUTPATIENT)
Dept: OBGYN | Facility: CLINIC | Age: 39
End: 2019-05-07
Payer: COMMERCIAL

## 2019-05-07 ENCOUNTER — OUTPATIENT (OUTPATIENT)
Dept: OUTPATIENT SERVICES | Facility: HOSPITAL | Age: 39
LOS: 1 days | Discharge: HOME | End: 2019-05-07

## 2019-05-07 VITALS
SYSTOLIC BLOOD PRESSURE: 102 MMHG | HEIGHT: 62 IN | DIASTOLIC BLOOD PRESSURE: 64 MMHG | WEIGHT: 133 LBS | BODY MASS INDEX: 24.48 KG/M2

## 2019-05-07 DIAGNOSIS — Z98.890 OTHER SPECIFIED POSTPROCEDURAL STATES: Chronic | ICD-10-CM

## 2019-05-07 DIAGNOSIS — E28.2 POLYCYSTIC OVARIAN SYNDROME: ICD-10-CM

## 2019-05-07 DIAGNOSIS — N96 RECURRENT PREGNANCY LOSS: ICD-10-CM

## 2019-05-07 DIAGNOSIS — Z31.69 ENCOUNTER FOR OTHER GENERAL COUNSELING AND ADVICE ON PROCREATION: ICD-10-CM

## 2019-05-07 PROCEDURE — 99213 OFFICE O/P EST LOW 20 MIN: CPT

## 2019-05-09 DIAGNOSIS — Z31.69 ENCOUNTER FOR OTHER GENERAL COUNSELING AND ADVICE ON PROCREATION: ICD-10-CM

## 2019-05-16 ENCOUNTER — OUTPATIENT (OUTPATIENT)
Dept: OUTPATIENT SERVICES | Facility: HOSPITAL | Age: 39
LOS: 1 days | Discharge: HOME | End: 2019-05-16
Payer: SUBSIDIZED

## 2019-05-16 DIAGNOSIS — Z98.890 OTHER SPECIFIED POSTPROCEDURAL STATES: Chronic | ICD-10-CM

## 2019-05-16 PROCEDURE — 90792 PSYCH DIAG EVAL W/MED SRVCS: CPT | Mod: GC

## 2019-05-17 DIAGNOSIS — F33.0 MAJOR DEPRESSIVE DISORDER, RECURRENT, MILD: ICD-10-CM

## 2019-05-20 PROBLEM — E28.2 POLYCYSTIC OVARIAN SYNDROME: Status: ACTIVE | Noted: 2019-05-20

## 2019-05-20 PROBLEM — N96 HISTORY OF RECURRENT MISCARRIAGES: Status: ACTIVE | Noted: 2019-05-20

## 2019-05-23 ENCOUNTER — OUTPATIENT (OUTPATIENT)
Dept: OUTPATIENT SERVICES | Facility: HOSPITAL | Age: 39
LOS: 1 days | Discharge: HOME | End: 2019-05-23
Payer: SUBSIDIZED

## 2019-05-23 DIAGNOSIS — Z98.890 OTHER SPECIFIED POSTPROCEDURAL STATES: Chronic | ICD-10-CM

## 2019-05-23 PROCEDURE — 99213 OFFICE O/P EST LOW 20 MIN: CPT | Mod: GC

## 2019-05-23 NOTE — END OF VISIT
[FreeTextEntry3] : 38 yo nullipara\par -likely pcos according to records: h/o irregular cycles (skipping q 2-3mo) + acne. testosterone levels normal in 2016 as well as tsh\par -h/o hyperprolatinemia: per records. 2 prior measurements elevated but <30. \par -recurrent miscarriages: 2003 [place] 6 [place]; 12/2017 11wks, cytogenetics 47XY, +10; 5/2018 SAB 6wks. work up: genetic counselling after SAB #2 who suggested karyotype for pt and partner, not done due to financial issues; 3/2018 B2 glycoprot 1 IgA ab neg, anticardiolipin ab neg, Lupus anticoagulant neg\par -5/2018 ASCUS, HPV neg. repeat cotesging 3y after\par \par pt desires to be referred to [facility] for further testing and management. referral provided

## 2019-06-04 ENCOUNTER — APPOINTMENT (OUTPATIENT)
Dept: OBGYN | Facility: CLINIC | Age: 39
End: 2019-06-04

## 2019-06-13 ENCOUNTER — APPOINTMENT (OUTPATIENT)
Dept: INTERNAL MEDICINE | Facility: CLINIC | Age: 39
End: 2019-06-13
Payer: COMMERCIAL

## 2019-06-13 ENCOUNTER — OUTPATIENT (OUTPATIENT)
Dept: OUTPATIENT SERVICES | Facility: HOSPITAL | Age: 39
LOS: 1 days | Discharge: HOME | End: 2019-06-13

## 2019-06-13 VITALS
BODY MASS INDEX: 24.29 KG/M2 | HEIGHT: 62 IN | SYSTOLIC BLOOD PRESSURE: 106 MMHG | DIASTOLIC BLOOD PRESSURE: 68 MMHG | HEART RATE: 82 BPM | WEIGHT: 132 LBS

## 2019-06-13 DIAGNOSIS — Z98.890 OTHER SPECIFIED POSTPROCEDURAL STATES: Chronic | ICD-10-CM

## 2019-06-13 DIAGNOSIS — J30.9 ALLERGIC RHINITIS, UNSPECIFIED: ICD-10-CM

## 2019-06-13 DIAGNOSIS — Z00.00 ENCOUNTER FOR GENERAL ADULT MEDICAL EXAMINATION W/OUT ABNORMAL FINDINGS: ICD-10-CM

## 2019-06-13 DIAGNOSIS — R53.83 OTHER FATIGUE: ICD-10-CM

## 2019-06-13 PROCEDURE — ZZZZZ: CPT

## 2019-06-13 RX ORDER — FLUTICASONE PROPIONATE 50 UG/1
50 SPRAY, METERED NASAL DAILY
Qty: 1 | Refills: 0 | Status: ACTIVE | COMMUNITY
Start: 2019-06-13 | End: 1900-01-01

## 2019-06-13 NOTE — REVIEW OF SYSTEMS
[Fatigue] : fatigue [Earache] : earache [Nasal Discharge] : nasal discharge [Sore Throat] : sore throat [Cough] : cough [Nausea] : nausea [Vomiting] : vomiting [Muscle Weakness] : muscle weakness [Muscle Pain] : muscle pain [Negative] : Integumentary [Fever] : no fever [Chills] : no chills [Postnasal Drip] : no postnasal drip [Chest Pain] : no chest pain [Palpitations] : no palpitations [Shortness Of Breath] : no shortness of breath [Wheezing] : no wheezing [Abdominal Pain] : no abdominal pain

## 2019-06-13 NOTE — PHYSICAL EXAM
[No Acute Distress] : no acute distress [No Respiratory Distress] : no respiratory distress  [Clear to Auscultation] : lungs were clear to auscultation bilaterally [No Accessory Muscle Use] : no accessory muscle use [Normal Rate] : normal rate  [Regular Rhythm] : with a regular rhythm [No Edema] : there was no peripheral edema [Soft] : abdomen soft [Non Tender] : non-tender [de-identified] : nasal congestion, post nasal drip.

## 2019-06-13 NOTE — HISTORY OF PRESENT ILLNESS
[FreeTextEntry1] : Pt is a 40 YO F who comes to the clinic for follow up with c/o two episodes of sore throat in 1 month.  [de-identified] : Pt is a 38 Yo F with PMH of H pylori gastritis in 2017 s/p treatment, comes for follow up. \par In past 1 month, pt had two episodes of sore throat a/w ear pain, cough ( 1 lasted 1 week), for which she was a private doctor for which she received 6 injections the first time ( whichh were antibiotics as recalled by pt)  and another injection with some pills second time. the episodes were a/w extreme weakness- which is present even now, no fevers. Pt describes that exposure to cold air can cause a trigger of these symptoms of sore throat, ear pain. Pt denies any known allergies. Pt has h/o known sick contacts before her symptoms began. The episodes resolved last week and she is taking cough meds as needed. \par  NO change in weight and appetite. \par Pt had a recent colonoscopy in april 2019 with GI that showed internal and external hemorrhoids.

## 2019-06-13 NOTE — ASSESSMENT
[FreeTextEntry1] : Pt is a 38 Yo F with PMH of H pylori gastritis in 2017 s/p treatment, comes for follow up. \par In past 1 month, pt had two episodes of sore throat a/w ear pain, cough ( 1 lasted 1 week), for which she was a private doctor for which she received 6 injections the first time ( which were antibiotics as recalled by pt)  and another injection with some pills second time. the episodes were a/w extreme weakness- which is present even now, no fevers. Pt describes that exposure to cold air can cause a trigger of these symptoms of sore throat, ear pain. Pt denies any known allergies. Pt has h/o known sick contacts before her symptoms began. The episodes resolved last week and she is taking cough meds as needed. \par  NO change in weight and appetite. \par Pt had a recent colonoscopy in april 2019 with GI that showed internal and external hemorrhoids. \par  [id number]. \par \par #)  recurrent episodes of sore throat, cough , with weakness/ fatigue: Exam was suggestive of allergic rhinitis. Will give Flonase and Claritin for 10 days. advised to f/up if any worsening. \par \par #) excessive episodes of fatigue ( proximal weakness)\par - TSH normal. Exercise advised. \par \par #) h/o H pylori- resolved\par \par #) h/o fibroadenoma\par - last mammogram in 2016- Birads 3\par - mammogram referral- given today\par - Pt will be f/up with gyne for pap smear. \par \par RTC in 6 months.

## 2019-06-17 DIAGNOSIS — R53.83 OTHER FATIGUE: ICD-10-CM

## 2019-06-17 DIAGNOSIS — J30.9 ALLERGIC RHINITIS, UNSPECIFIED: ICD-10-CM

## 2019-06-17 DIAGNOSIS — J02.9 ACUTE PHARYNGITIS, UNSPECIFIED: ICD-10-CM

## 2019-06-19 ENCOUNTER — FORM ENCOUNTER (OUTPATIENT)
Age: 39
End: 2019-06-19

## 2019-06-19 DIAGNOSIS — N63.10 UNSPECIFIED LUMP IN THE RIGHT BREAST, UNSPECIFIED QUADRANT: ICD-10-CM

## 2019-06-20 ENCOUNTER — OUTPATIENT (OUTPATIENT)
Dept: OUTPATIENT SERVICES | Facility: HOSPITAL | Age: 39
LOS: 1 days | Discharge: HOME | End: 2019-06-20
Payer: OTHER GOVERNMENT

## 2019-06-20 DIAGNOSIS — Z98.890 OTHER SPECIFIED POSTPROCEDURAL STATES: Chronic | ICD-10-CM

## 2019-06-20 DIAGNOSIS — Z12.31 ENCOUNTER FOR SCREENING MAMMOGRAM FOR MALIGNANT NEOPLASM OF BREAST: ICD-10-CM

## 2019-06-20 PROCEDURE — 77063 BREAST TOMOSYNTHESIS BI: CPT | Mod: 26

## 2019-06-20 PROCEDURE — 77067 SCR MAMMO BI INCL CAD: CPT | Mod: 26

## 2019-06-21 PROBLEM — N63.10 BREAST MASS, RIGHT: Status: ACTIVE | Noted: 2019-06-21

## 2019-06-25 ENCOUNTER — OUTPATIENT (OUTPATIENT)
Dept: OUTPATIENT SERVICES | Facility: HOSPITAL | Age: 39
LOS: 1 days | Discharge: HOME | End: 2019-06-25

## 2019-06-25 ENCOUNTER — APPOINTMENT (OUTPATIENT)
Dept: OBGYN | Facility: CLINIC | Age: 39
End: 2019-06-25
Payer: COMMERCIAL

## 2019-06-25 VITALS
BODY MASS INDEX: 24.48 KG/M2 | HEIGHT: 62 IN | DIASTOLIC BLOOD PRESSURE: 80 MMHG | SYSTOLIC BLOOD PRESSURE: 100 MMHG | WEIGHT: 133 LBS

## 2019-06-25 DIAGNOSIS — Z98.890 OTHER SPECIFIED POSTPROCEDURAL STATES: Chronic | ICD-10-CM

## 2019-06-25 DIAGNOSIS — Z01.419 ENCOUNTER FOR GYNECOLOGICAL EXAMINATION (GENERAL) (ROUTINE) W/OUT ABNORMAL FINDINGS: ICD-10-CM

## 2019-06-25 PROCEDURE — 99213 OFFICE O/P EST LOW 20 MIN: CPT | Mod: 25

## 2019-06-25 PROCEDURE — 99396 PREV VISIT EST AGE 40-64: CPT

## 2019-06-28 DIAGNOSIS — Z01.419 ENCOUNTER FOR GYNECOLOGICAL EXAMINATION (GENERAL) (ROUTINE) WITHOUT ABNORMAL FINDINGS: ICD-10-CM

## 2019-06-28 DIAGNOSIS — R10.2 PELVIC AND PERINEAL PAIN: ICD-10-CM

## 2019-07-01 NOTE — COUNSELING
[Nutrition] : nutrition [Exercise] : exercise [Vitamins/Supplements] : vitamins/supplements [STD (testing, results, tx)] : STD (testing, results, tx) [Menstrual Calendar] : menstrual calendar

## 2019-07-01 NOTE — PHYSICAL EXAM
[Awake] : awake [Alert] : alert [Soft] : soft [Labia Majora] : labia major [Normal] : clitoris [No Bleeding] : there was no active vaginal bleeding [Labia Minora] : labia minora [Anteversion] : anteverted [Uterine Adnexae] : were not tender and not enlarged [Acute Distress] : no acute distress [LAD] : no lymphadenopathy [Mass] : no breast mass [Nipple Discharge] : no nipple discharge [Axillary LAD] : no axillary lymphadenopathy [Tender] : non tender [Distended] : not distended [H/Smegaly] : no hepatosplenomegaly [Discharge] : had no discharge [Pap Obtained] : a Pap smear was not performed [Tenderness] : nontender [Adnexa Tenderness] : were not tender [Ovarian Mass (___ Cm)] : there were no adnexal masses [Adnexa Tenderness On The Right] : was not tender [___] : no mass was palpated in the right adnexa

## 2019-07-02 ENCOUNTER — FORM ENCOUNTER (OUTPATIENT)
Age: 39
End: 2019-07-02

## 2019-07-03 ENCOUNTER — OUTPATIENT (OUTPATIENT)
Dept: OUTPATIENT SERVICES | Facility: HOSPITAL | Age: 39
LOS: 1 days | Discharge: HOME | End: 2019-07-03
Payer: OTHER GOVERNMENT

## 2019-07-03 DIAGNOSIS — Z98.890 OTHER SPECIFIED POSTPROCEDURAL STATES: Chronic | ICD-10-CM

## 2019-07-03 DIAGNOSIS — R92.8 OTHER ABNORMAL AND INCONCLUSIVE FINDINGS ON DIAGNOSTIC IMAGING OF BREAST: ICD-10-CM

## 2019-07-03 PROCEDURE — 76642 ULTRASOUND BREAST LIMITED: CPT | Mod: 26,RT

## 2019-07-03 PROCEDURE — G0279: CPT | Mod: 26,RT

## 2019-07-03 PROCEDURE — 77065 DX MAMMO INCL CAD UNI: CPT | Mod: 26,RT

## 2019-07-12 ENCOUNTER — APPOINTMENT (OUTPATIENT)
Dept: ANTEPARTUM | Facility: CLINIC | Age: 39
End: 2019-07-12
Payer: COMMERCIAL

## 2019-07-12 DIAGNOSIS — R10.2 PELVIC AND PERINEAL PAIN: ICD-10-CM

## 2019-07-12 DIAGNOSIS — D21.9 BENIGN NEOPLASM OF CONNECTIVE AND OTHER SOFT TISSUE, UNSPECIFIED: ICD-10-CM

## 2019-07-12 PROCEDURE — 76830 TRANSVAGINAL US NON-OB: CPT | Mod: 26

## 2019-07-18 LAB
ESTRADIOL SERPL-MCNC: 26 PG/ML
FSH SERPL-MCNC: 6.4 IU/L

## 2019-07-19 ENCOUNTER — APPOINTMENT (OUTPATIENT)
Dept: OBGYN | Facility: CLINIC | Age: 39
End: 2019-07-19
Payer: COMMERCIAL

## 2019-07-19 ENCOUNTER — OUTPATIENT (OUTPATIENT)
Dept: OUTPATIENT SERVICES | Facility: HOSPITAL | Age: 39
LOS: 1 days | Discharge: HOME | End: 2019-07-19

## 2019-07-19 VITALS
SYSTOLIC BLOOD PRESSURE: 120 MMHG | WEIGHT: 133 LBS | HEIGHT: 62 IN | DIASTOLIC BLOOD PRESSURE: 60 MMHG | BODY MASS INDEX: 24.48 KG/M2

## 2019-07-19 DIAGNOSIS — N97.9 FEMALE INFERTILITY, UNSPECIFIED: ICD-10-CM

## 2019-07-19 DIAGNOSIS — Z98.890 OTHER SPECIFIED POSTPROCEDURAL STATES: Chronic | ICD-10-CM

## 2019-07-19 LAB — ANTI-MUELLERIAN HORMONE: 1.98 NG/ML

## 2019-07-19 PROCEDURE — 99212 OFFICE O/P EST SF 10 MIN: CPT

## 2019-07-25 ENCOUNTER — MEDICATION RENEWAL (OUTPATIENT)
Age: 39
End: 2019-07-25

## 2019-07-25 DIAGNOSIS — F43.10 POST-TRAUMATIC STRESS DISORDER, UNSPECIFIED: ICD-10-CM

## 2019-07-25 RX ORDER — SERTRALINE 25 MG/1
25 TABLET, FILM COATED ORAL DAILY
Qty: 30 | Refills: 1 | Status: ACTIVE | COMMUNITY
Start: 2019-07-25 | End: 1900-01-01

## 2019-07-26 PROBLEM — N97.9 INFERTILITY, FEMALE: Status: ACTIVE | Noted: 2019-07-26

## 2019-07-30 DIAGNOSIS — N97.9 FEMALE INFERTILITY, UNSPECIFIED: ICD-10-CM

## 2019-09-12 ENCOUNTER — APPOINTMENT (OUTPATIENT)
Dept: INTERNAL MEDICINE | Facility: CLINIC | Age: 39
End: 2019-09-12

## 2021-10-20 NOTE — ASU PREOP CHECKLIST - HEIGHT IN INCHES
Last visit 06/28/2021 Virtual visit MD Ester Olsen   Next appointment 12/28/2021 MD Ester Olsen   Previous refill encounter(s)   10/21/2020 Advair #3 with 3 refills     Requested Prescriptions     Pending Prescriptions Disp Refills    fluticasone propion-salmeteroL (ADVAIR/WIXELA) 250-50 mcg/dose diskus inhaler 3 Each 0     Sig: Take 1 Puff by inhalation two (2) times a day. 3

## 2022-07-25 NOTE — ED PROVIDER NOTE - PHYSICAL EXAMINATION
----- Message from Hayden Zaidi sent at 7/25/2022 10:43 AM EDT -----  Regarding: colonoscopy prep  I have a colonoscopy appointment on 08/05/22 I have misplaced the papers with the prep explain   could you fax them to me at work ( [phone]) or send them through [facility] ?   thank you for your time
Spoke with patient  Prep instructions were emailed  Also rescheduled procedure to 9/14/22 at [facility] with Dr Alejandra Miller 
Physical Examination:   VITAL SIGNS: I have reviewed vital signs.  CONSTITUTIONAL: well appearing, NAD.   SKIN: Skin exam is warm and dry.  No rashes  HEAD: Normocephalic; atraumatic.  EYES: PERRL, EOM intact; conjunctiva and sclera clear.  ENT: No nasal discharge; airway clear.  CARD: S1, S2 reg  RESP: CTAB. No wheezes, rales or rhonchi.  ABD: soft; non-distended; non-tender  : external exam WNL. no vag bleeding noted.  cervical os closed. no CMT. no uterine ttp. no adnexal masses palpated.   EXT: Normal ROM. No edema.  NEURO: Alert, oriented. Grossly unremarkable. No focal deficits. Ambulatory with steady gait.  PSYCH: Cooperative, appropriate.

## 2022-08-11 NOTE — ED ADULT NURSE NOTE - PRO INTERPRETER NEED 2
English
(1) Drift; limb holds 90 (or 45) degrees, but drifts down before full 10 seconds; does not hit bed or other support